# Patient Record
Sex: MALE | Race: WHITE | NOT HISPANIC OR LATINO | Employment: UNEMPLOYED | URBAN - METROPOLITAN AREA
[De-identification: names, ages, dates, MRNs, and addresses within clinical notes are randomized per-mention and may not be internally consistent; named-entity substitution may affect disease eponyms.]

---

## 2019-09-19 ENCOUNTER — TELEPHONE (OUTPATIENT)
Dept: FAMILY MEDICINE CLINIC | Facility: CLINIC | Age: 59
End: 2019-09-19

## 2019-09-19 DIAGNOSIS — F32.A DEPRESSION, UNSPECIFIED DEPRESSION TYPE: Primary | ICD-10-CM

## 2019-09-19 NOTE — TELEPHONE ENCOUNTER
Patient states he called a couple weeks ago, but there wasn't any thing in his chart.  Anyway, he needs a refill on escitalopram 20 mgs, but he is wanting to know if it can be increased to 40 mg or 60 mg.  He says when he saw Dr El tirado, she said if he wanted it increased to just call.  [facility], phone number is [phone].

## 2019-09-19 NOTE — TELEPHONE ENCOUNTER
Patient states he called a couple weeks ago, but there wasn't any thing in his chart.  Anyway, he needs a refill on escitalopram 20 mgs, but he is wanting to know if it can be increased to 40 mg or 60 mg.  He says when he saw Dr El tirado, she said if he wanted it increased to just call.  Ph[facility] is [facility], phone number is [phone].

## 2019-09-20 DIAGNOSIS — F32.A DEPRESSION, UNSPECIFIED DEPRESSION TYPE: ICD-10-CM

## 2019-09-20 RX ORDER — CITALOPRAM 40 MG/1
40 TABLET ORAL DAILY
Qty: 90 TABLET | Refills: 3 | Status: SHIPPED | OUTPATIENT
Start: 2019-09-20 | End: 2019-10-03 | Stop reason: SDUPTHER

## 2019-09-20 RX ORDER — CITALOPRAM 40 MG/1
40 TABLET ORAL DAILY
Qty: 90 TABLET | Refills: 3 | Status: SHIPPED | OUTPATIENT
Start: 2019-09-20 | End: 2019-09-20 | Stop reason: SDUPTHER

## 2019-10-03 ENCOUNTER — OFFICE VISIT (OUTPATIENT)
Dept: FAMILY MEDICINE CLINIC | Facility: CLINIC | Age: 59
End: 2019-10-03

## 2019-10-03 VITALS
WEIGHT: 180.31 LBS | TEMPERATURE: 98.6 F | OXYGEN SATURATION: 98 % | HEIGHT: 70 IN | HEART RATE: 62 BPM | SYSTOLIC BLOOD PRESSURE: 122 MMHG | BODY MASS INDEX: 25.81 KG/M2 | DIASTOLIC BLOOD PRESSURE: 78 MMHG

## 2019-10-03 DIAGNOSIS — Z00.00 HEALTH CARE MAINTENANCE: Primary | ICD-10-CM

## 2019-10-03 DIAGNOSIS — E78.5 HYPERLIPIDEMIA, UNSPECIFIED HYPERLIPIDEMIA TYPE: ICD-10-CM

## 2019-10-03 DIAGNOSIS — F32.A DEPRESSION, UNSPECIFIED DEPRESSION TYPE: ICD-10-CM

## 2019-10-03 PROCEDURE — 90750 HZV VACC RECOMBINANT IM: CPT | Performed by: FAMILY MEDICINE

## 2019-10-03 PROCEDURE — 90472 IMMUNIZATION ADMIN EACH ADD: CPT | Performed by: FAMILY MEDICINE

## 2019-10-03 PROCEDURE — 90471 IMMUNIZATION ADMIN: CPT | Performed by: FAMILY MEDICINE

## 2019-10-03 PROCEDURE — 90632 HEPA VACCINE ADULT IM: CPT | Performed by: FAMILY MEDICINE

## 2019-10-03 PROCEDURE — 90686 IIV4 VACC NO PRSV 0.5 ML IM: CPT | Performed by: FAMILY MEDICINE

## 2019-10-03 PROCEDURE — 99213 OFFICE O/P EST LOW 20 MIN: CPT | Performed by: FAMILY MEDICINE

## 2019-10-03 RX ORDER — ATORVASTATIN CALCIUM 40 MG/1
40 TABLET, FILM COATED ORAL DAILY
COMMUNITY
End: 2019-10-03

## 2019-10-03 RX ORDER — CITALOPRAM 40 MG/1
40 TABLET ORAL DAILY
Qty: 90 TABLET | Refills: 11 | Status: SHIPPED | OUTPATIENT
Start: 2019-10-03 | End: 2020-03-06 | Stop reason: SDUPTHER

## 2019-10-03 NOTE — PROGRESS NOTES
Subjective   Srinivasa Paz is a 59 y.o. male.     Chief Complaint   Patient presents with   • Hyperlipidemia   • Depression       Hyperlipidemia   This is a chronic problem. The current episode started more than 1 year ago. The problem is controlled. Recent lipid tests were reviewed and are normal. There are no known factors aggravating his hyperlipidemia. Pertinent negatives include no chest pain or shortness of breath. Current antihyperlipidemic treatment includes statins. The current treatment provides moderate improvement of lipids. Compliance problems include medication cost.  There are no known risk factors for coronary artery disease.   Depression   Visit Type: follow-up (stable on mes)  Patient presents with the following symptoms: weight loss.  Patient is not experiencing: palpitations and shortness of breath.           The following portions of the patient's history were reviewed and updated as appropriate: allergies, current medications, past family history, past medical history, past social history, past surgical history and problem list.    Past Medical History:   Diagnosis Date   • Abdominal pain, LLQ (left lower quadrant)    • Adverse reaction to drug    • Anxiety    • Bone pain    • Chest pain, midsternal    • Cough    • Current every day smoker    • Depression    • Encounter for smoking cessation counseling    • Hematuria    • High risk medication use    • Hyperglycemia    • Hyperlipidemia    • Irritability and anger    • Lymphoma (CMS/HCC)    • Proteinuria    • Sciatica    • Skin benign neoplasm    • Visual changes        Past Surgical History:   Procedure Laterality Date   • KNEE SURGERY Left    • KNEE SURGERY     • LYMPH NODE BIOPSY         Family History   Problem Relation Age of Onset   • Heart attack Father    • No Known Problems Other        Social History     Socioeconomic History   • Marital status:      Spouse name: Not on file   • Number of children: Not on file   • Years of  education: Not on file   • Highest education level: Not on file   Tobacco Use   • Smoking status: Current Every Day Smoker   • Smokeless tobacco: Never Used   Substance and Sexual Activity   • Alcohol use: Yes   • Drug use: No   • Sexual activity: Yes       Current Outpatient Medications on File Prior to Visit   Medication Sig Dispense Refill   • [DISCONTINUED] citalopram (CELEXA) 40 MG tablet Take 1 tablet by mouth Daily. 90 tablet 3   • [DISCONTINUED] atorvastatin (LIPITOR) 40 MG tablet Take 40 mg by mouth Daily.       No current facility-administered medications on file prior to visit.        Review of Systems   Constitutional: Positive for unexpected weight loss. Negative for fatigue.   Eyes: Negative for blurred vision.   Respiratory: Negative for cough, chest tightness and shortness of breath.    Cardiovascular: Negative for chest pain, palpitations and leg swelling.   Neurological: Negative for dizziness, light-headedness and headache.       Recent Results (from the past 4704 hour(s))   COMPREHENSIVE METABOLIC PANEL    Collection Time: 09/16/19  3:30 PM   Result Value Ref Range    Sodium 142 137 - 145 mmol/L    Potassium 4.5 3.5 - 5.1 mmol/L    Chloride 105 98 - 107 mmol/L    Total CO2 28 22 - 30 mmol/L    Glucose 89 74 - 99 mg/dL    BUN 14 7 - 20 mg/dL    Creatinine 1.2 0.7 - 1.5 mg/dL    BUN/Creatinine Ratio 11.7 RATIO    Total Protein 7.5 6.3 - 8.2 g/dL    Albumin 4.3 3.5 - 5.0 g/dL    Globulin 3.2 1.5 - 4.5 g/dL    A/G Ratio 1.3 1.1 - 2.5 RATIO    Calcium 9.4 8.4 - 10.2 mg/dL    Total Bilirubin 0.6 0.2 - 1.3 mg/dL    AST (SGOT) 27 15 - 46 U/L    ALT (SGPT) 27 13 - 69 U/L    Alkaline Phosphatase 109 38 - 126 U/L    Est GFR by Clearance >60 >60 /1.73 m2   LACTATE DEHYDROGENASE    Collection Time: 09/16/19  3:30 PM   Result Value Ref Range     313 - 618 U/L   CBC AND DIFFERENTIAL    Collection Time: 09/16/19  3:30 PM   Result Value Ref Range    WBC 5.95 4.5 - 11.0 10*3/uL    RBC 5.11 4.5 - 5.9 10*6/uL  "   Hemoglobin 15.5 13.5 - 17.5 g/dL    Hematocrit 45.8 41.0 - 53.0 %    MCV 89.6 80.0 - 100.0 fL    MCH 30.3 26.0 - 34.0 pg    MCHC 33.8 31.0 - 37.0 g/dL    RDW 13.6 12.0 - 16.8 %    Platelets 169 140 - 440 10*3/uL    MPV 9.4 6.7 - 10.8 fL    Neutrophil Rel % 66.4 45 - 80 %    Lymphocyte Rel % 23.0 15 - 50 %    Monocyte Rel % 6.6 0 - 15 %    Eosinophil % 3.0 0 - 7 %    Basophil Rel % 1.0 0 - 2 %    Neutrophils Absolute 3.95 2.0 - 8.8 10*3/uL    Lymphocytes Absolute 1.37 0.7 - 5.5 10*3/uL    Monocytes Absolute 0.39 0.0 - 1.7 10*3/uL    Eosinophils Absolute 0.18 0.0 - 0.8 10*3/uL    Basophils Absolute 0.06 0.0 - 0.2 10*3/uL     Objective   Vitals:    10/03/19 0738   BP: 122/78   Pulse: 62   Temp: 98.6 °F (37 °C)   SpO2: 98%   Weight: 81.8 kg (180 lb 5 oz)   Height: 177.8 cm (70\")     Body mass index is 25.87 kg/m².  Physical Exam   Constitutional: He appears well-developed and well-nourished. No distress.   Cardiovascular: Normal rate and regular rhythm. Exam reveals no friction rub.   No murmur heard.  Skin: He is not diaphoretic.   Psychiatric:   STABLE.   Vitals reviewed.        Assessment/Plan   Srinivasa was seen today for hyperlipidemia and depression.    Diagnoses and all orders for this visit:    Health care maintenance  -     Fluarix/Fluzone/Afluria Quad>6 Months  -     Hepatitis A Vaccine Adult IM  -     Shingrix Vaccine    Hyperlipidemia, unspecified hyperlipidemia type  -     Comprehensive Metabolic Panel  -     Lipid Panel  -     CBC & Differential  -     Pitavastatin Calcium (LIVALO) 4 MG tablet; Take 1 tablet by mouth Every Night.    Depression, unspecified depression type  -     citalopram (CELEXA) 40 MG tablet; Take 1 tablet by mouth Daily.               "

## 2019-10-04 LAB
ALBUMIN SERPL-MCNC: 4.4 G/DL (ref 3.5–5.2)
ALBUMIN/GLOB SERPL: 2 G/DL
ALP SERPL-CCNC: 118 U/L (ref 39–117)
ALT SERPL-CCNC: 17 U/L (ref 1–41)
AST SERPL-CCNC: 19 U/L (ref 1–40)
BASOPHILS # BLD AUTO: 0.09 10*3/MM3 (ref 0–0.2)
BASOPHILS NFR BLD AUTO: 1.5 % (ref 0–1.5)
BILIRUB SERPL-MCNC: 0.4 MG/DL (ref 0.2–1.2)
BUN SERPL-MCNC: 13 MG/DL (ref 8–23)
BUN/CREAT SERPL: 12.6 (ref 7–25)
CALCIUM SERPL-MCNC: 8.8 MG/DL (ref 8.6–10.5)
CHLORIDE SERPL-SCNC: 101 MMOL/L (ref 98–107)
CHOLEST SERPL-MCNC: 229 MG/DL (ref 0–200)
CO2 SERPL-SCNC: 25.8 MMOL/L (ref 22–29)
CREAT SERPL-MCNC: 1.03 MG/DL (ref 0.76–1.27)
EOSINOPHIL # BLD AUTO: 0.21 10*3/MM3 (ref 0–0.4)
EOSINOPHIL NFR BLD AUTO: 3.4 % (ref 0.3–6.2)
ERYTHROCYTE [DISTWIDTH] IN BLOOD BY AUTOMATED COUNT: 13.3 % (ref 12.3–15.4)
GLOBULIN SER CALC-MCNC: 2.2 GM/DL
GLUCOSE SERPL-MCNC: 95 MG/DL (ref 65–99)
HCT VFR BLD AUTO: 47.4 % (ref 37.5–51)
HDLC SERPL-MCNC: 42 MG/DL (ref 40–60)
HGB BLD-MCNC: 15.7 G/DL (ref 13–17.7)
IMM GRANULOCYTES # BLD AUTO: 0.01 10*3/MM3 (ref 0–0.05)
IMM GRANULOCYTES NFR BLD AUTO: 0.2 % (ref 0–0.5)
LDLC SERPL CALC-MCNC: 156 MG/DL (ref 0–100)
LYMPHOCYTES # BLD AUTO: 1.18 10*3/MM3 (ref 0.7–3.1)
LYMPHOCYTES NFR BLD AUTO: 19.3 % (ref 19.6–45.3)
MCH RBC QN AUTO: 30 PG (ref 26.6–33)
MCHC RBC AUTO-ENTMCNC: 33.1 G/DL (ref 31.5–35.7)
MCV RBC AUTO: 90.6 FL (ref 79–97)
MONOCYTES # BLD AUTO: 0.47 10*3/MM3 (ref 0.1–0.9)
MONOCYTES NFR BLD AUTO: 7.7 % (ref 5–12)
NEUTROPHILS # BLD AUTO: 4.16 10*3/MM3 (ref 1.7–7)
NEUTROPHILS NFR BLD AUTO: 67.9 % (ref 42.7–76)
NRBC BLD AUTO-RTO: 0 /100 WBC (ref 0–0.2)
PLATELET # BLD AUTO: 173 10*3/MM3 (ref 140–450)
POTASSIUM SERPL-SCNC: 4.2 MMOL/L (ref 3.5–5.2)
PROT SERPL-MCNC: 6.6 G/DL (ref 6–8.5)
RBC # BLD AUTO: 5.23 10*6/MM3 (ref 4.14–5.8)
SODIUM SERPL-SCNC: 140 MMOL/L (ref 136–145)
TRIGL SERPL-MCNC: 156 MG/DL (ref 0–150)
VLDLC SERPL CALC-MCNC: 31.2 MG/DL
WBC # BLD AUTO: 6.12 10*3/MM3 (ref 3.4–10.8)

## 2020-02-21 ENCOUNTER — TELEPHONE (OUTPATIENT)
Dept: FAMILY MEDICINE CLINIC | Facility: CLINIC | Age: 60
End: 2020-02-21

## 2020-03-06 ENCOUNTER — OFFICE VISIT (OUTPATIENT)
Dept: FAMILY MEDICINE CLINIC | Facility: CLINIC | Age: 60
End: 2020-03-06

## 2020-03-06 VITALS
BODY MASS INDEX: 26.7 KG/M2 | DIASTOLIC BLOOD PRESSURE: 62 MMHG | OXYGEN SATURATION: 98 % | WEIGHT: 186.5 LBS | SYSTOLIC BLOOD PRESSURE: 104 MMHG | HEART RATE: 60 BPM | HEIGHT: 70 IN

## 2020-03-06 DIAGNOSIS — Z00.00 MEDICARE ANNUAL WELLNESS VISIT, INITIAL: Primary | ICD-10-CM

## 2020-03-06 DIAGNOSIS — F32.A DEPRESSION, UNSPECIFIED DEPRESSION TYPE: ICD-10-CM

## 2020-03-06 DIAGNOSIS — E78.5 HYPERLIPIDEMIA, UNSPECIFIED HYPERLIPIDEMIA TYPE: ICD-10-CM

## 2020-03-06 PROCEDURE — 99213 OFFICE O/P EST LOW 20 MIN: CPT | Performed by: FAMILY MEDICINE

## 2020-03-06 PROCEDURE — G0438 PPPS, INITIAL VISIT: HCPCS | Performed by: FAMILY MEDICINE

## 2020-03-06 RX ORDER — CITALOPRAM 40 MG/1
40 TABLET ORAL DAILY
Qty: 90 TABLET | Refills: 11 | Status: SHIPPED | OUTPATIENT
Start: 2020-03-06 | End: 2020-06-12

## 2020-03-06 NOTE — PATIENT INSTRUCTIONS
Medicare Wellness  Personal Prevention Plan of Service     Date of Office Visit:  2020  Encounter Provider:  Nita Gallegos MD  Place of Service:  [facility]  Patient Name: Srinivasa Paz  :  1960    As part of the Medicare Wellness portion of your visit today, we are providing you with this personalized preventive plan of services (PPPS). This plan is based upon recommendations of the [place] Preventive Services Task Force (USPSTF) and the Advisory Committee on Immunization Practices (ACIP).    This lists the preventive care services that should be considered, and provides dates of when you are due. Items listed as completed are up-to-date and do not require any further intervention.    Health Maintenance   Topic Date Due   • ZOSTER VACCINE (2 of 2) 2019   • TDAP/TD VACCINES (1 - Tdap) 2021 (Originally 1970)   • LIPID PANEL  10/03/2020   • MEDICARE ANNUAL WELLNESS  2021   • COLONOSCOPY  2027   • PNEUMOCOCCAL VACCINE (19-64 MEDIUM RISK)  Completed   • INFLUENZA VACCINE  Completed   • HEPATITIS C SCREENING  Discontinued       No orders of the defined types were placed in this encounter.      Return in about 1 year (around 3/6/2021) for Medicare Wellness.

## 2020-03-06 NOTE — PROGRESS NOTES
Mary Jo Paz is a 59 y.o. male.     Chief Complaint   Patient presents with   • Hyperlipidemia       Hyperlipidemia   This is a chronic problem. The current episode started more than 1 year ago. The problem is uncontrolled. Recent lipid tests were reviewed and are high. Pertinent negatives include no chest pain or shortness of breath. Current antihyperlipidemic treatment includes statins.   Depression   Visit Type: follow-up (stable on meds)  Patient is not experiencing: palpitations and shortness of breath.           The following portions of the patient's history were reviewed and updated as appropriate: allergies, current medications, past family history, past medical history, past social history, past surgical history and problem list.    Past Medical History:   Diagnosis Date   • Abdominal pain, LLQ (left lower quadrant)    • Adverse reaction to drug    • Anxiety    • Bone pain    • Chest pain, midsternal    • Cough    • Current every day smoker    • Depression    • Encounter for smoking cessation counseling    • Hematuria    • High risk medication use    • Hyperglycemia    • Hyperlipidemia    • Irritability and anger    • Lymphoma (CMS/HCC)    • Proteinuria    • Sciatica    • Skin benign neoplasm    • Visual changes        Past Surgical History:   Procedure Laterality Date   • KNEE SURGERY Left    • KNEE SURGERY     • LYMPH NODE BIOPSY         Family History   Problem Relation Age of Onset   • Heart attack Father    • No Known Problems Other        Social History     Socioeconomic History   • Marital status:      Spouse name: Not on file   • Number of children: Not on file   • Years of education: Not on file   • Highest education level: Not on file   Tobacco Use   • Smoking status: Current Every Day Smoker   • Smokeless tobacco: Never Used   Substance and Sexual Activity   • Alcohol use: Yes   • Drug use: No   • Sexual activity: Yes       Current Outpatient Medications on File Prior to Visit    Medication Sig Dispense Refill   • [DISCONTINUED] citalopram (CELEXA) 40 MG tablet Take 1 tablet by mouth Daily. 90 tablet 11   • [DISCONTINUED] Pitavastatin Calcium (LIVALO) 4 MG tablet Take 1 tablet by mouth Every Night. 30 tablet 11     No current facility-administered medications on file prior to visit.        Review of Systems   Constitutional: Negative for fatigue.   Eyes: Negative for blurred vision.   Respiratory: Negative for cough, chest tightness and shortness of breath.    Cardiovascular: Negative for chest pain, palpitations and leg swelling.   Neurological: Negative for dizziness, light-headedness and headache.       Recent Results (from the past 4704 hour(s))   COMPREHENSIVE METABOLIC PANEL    Collection Time: 09/16/19  3:30 PM   Result Value Ref Range    Sodium 142 137 - 145 mmol/L    Potassium 4.5 3.5 - 5.1 mmol/L    Chloride 105 98 - 107 mmol/L    Total CO2 28 22 - 30 mmol/L    Glucose 89 74 - 99 mg/dL    BUN 14 7 - 20 mg/dL    Creatinine 1.2 0.7 - 1.5 mg/dL    BUN/Creatinine Ratio 11.7 RATIO    Total Protein 7.5 6.3 - 8.2 g/dL    Albumin 4.3 3.5 - 5.0 g/dL    Globulin 3.2 1.5 - 4.5 g/dL    A/G Ratio 1.3 1.1 - 2.5 RATIO    Calcium 9.4 8.4 - 10.2 mg/dL    Total Bilirubin 0.6 0.2 - 1.3 mg/dL    AST (SGOT) 27 15 - 46 U/L    ALT (SGPT) 27 13 - 69 U/L    Alkaline Phosphatase 109 38 - 126 U/L    Est GFR by Clearance >60 >60 /1.73 m2   CBC AND DIFFERENTIAL    Collection Time: 09/16/19  3:30 PM   Result Value Ref Range    WBC 5.95 4.5 - 11.0 10*3/uL    RBC 5.11 4.5 - 5.9 10*6/uL    Hemoglobin 15.5 13.5 - 17.5 g/dL    Hematocrit 45.8 41.0 - 53.0 %    MCV 89.6 80.0 - 100.0 fL    MCH 30.3 26.0 - 34.0 pg    MCHC 33.8 31.0 - 37.0 g/dL    RDW 13.6 12.0 - 16.8 %    Platelets 169 140 - 440 10*3/uL    MPV 9.4 6.7 - 10.8 fL    Neutrophil Rel % 66.4 45 - 80 %    Lymphocyte Rel % 23.0 15 - 50 %    Monocyte Rel % 6.6 0 - 15 %    Eosinophil % 3.0 0 - 7 %    Basophil Rel % 1.0 0 - 2 %    Neutrophils Absolute 3.95 2.0 -  8.8 10*3/uL    Lymphocytes Absolute 1.37 0.7 - 5.5 10*3/uL    Monocytes Absolute 0.39 0.0 - 1.7 10*3/uL    Eosinophils Absolute 0.18 0.0 - 0.8 10*3/uL    Basophils Absolute 0.06 0.0 - 0.2 10*3/uL   LACTATE DEHYDROGENASE    Collection Time: 09/16/19  3:30 PM   Result Value Ref Range     313 - 618 U/L   Comprehensive Metabolic Panel    Collection Time: 10/03/19  8:49 AM   Result Value Ref Range    Glucose 95 65 - 99 mg/dL    BUN 13 8 - 23 mg/dL    Creatinine 1.03 0.76 - 1.27 mg/dL    eGFR Non African Am 74 >60 mL/min/1.73    eGFR African Am 89 >60 mL/min/1.73    BUN/Creatinine Ratio 12.6 7.0 - 25.0    Sodium 140 136 - 145 mmol/L    Potassium 4.2 3.5 - 5.2 mmol/L    Chloride 101 98 - 107 mmol/L    Total CO2 25.8 22.0 - 29.0 mmol/L    Calcium 8.8 8.6 - 10.5 mg/dL    Total Protein 6.6 6.0 - 8.5 g/dL    Albumin 4.40 3.50 - 5.20 g/dL    Globulin 2.2 gm/dL    A/G Ratio 2.0 g/dL    Total Bilirubin 0.4 0.2 - 1.2 mg/dL    Alkaline Phosphatase 118 (H) 39 - 117 U/L    AST (SGOT) 19 1 - 40 U/L    ALT (SGPT) 17 1 - 41 U/L   Lipid Panel    Collection Time: 10/03/19  8:49 AM   Result Value Ref Range    Total Cholesterol 229 (H) 0 - 200 mg/dL    Triglycerides 156 (H) 0 - 150 mg/dL    HDL Cholesterol 42 40 - 60 mg/dL    VLDL Cholesterol 31.2 mg/dL    LDL Cholesterol  156 (H) 0 - 100 mg/dL   CBC & Differential    Collection Time: 10/03/19  8:49 AM   Result Value Ref Range    WBC 6.12 3.40 - 10.80 10*3/mm3    RBC 5.23 4.14 - 5.80 10*6/mm3    Hemoglobin 15.7 13.0 - 17.7 g/dL    Hematocrit 47.4 37.5 - 51.0 %    MCV 90.6 79.0 - 97.0 fL    MCH 30.0 26.6 - 33.0 pg    MCHC 33.1 31.5 - 35.7 g/dL    RDW 13.3 12.3 - 15.4 %    Platelets 173 140 - 450 10*3/mm3    Neutrophil Rel % 67.9 42.7 - 76.0 %    Lymphocyte Rel % 19.3 (L) 19.6 - 45.3 %    Monocyte Rel % 7.7 5.0 - 12.0 %    Eosinophil Rel % 3.4 0.3 - 6.2 %    Basophil Rel % 1.5 0.0 - 1.5 %    Neutrophils Absolute 4.16 1.70 - 7.00 10*3/mm3    Lymphocytes Absolute 1.18 0.70 - 3.10  "10*3/mm3    Monocytes Absolute 0.47 0.10 - 0.90 10*3/mm3    Eosinophils Absolute 0.21 0.00 - 0.40 10*3/mm3    Basophils Absolute 0.09 0.00 - 0.20 10*3/mm3    Immature Granulocyte Rel % 0.2 0.0 - 0.5 %    Immature Grans Absolute 0.01 0.00 - 0.05 10*3/mm3    nRBC 0.0 0.0 - 0.2 /100 WBC     Objective   Vitals:    03/06/20 0824   BP: 104/62   Pulse: 60   SpO2: 98%   Weight: 84.6 kg (186 lb 8 oz)   Height: 177.8 cm (70\")     Body mass index is 26.76 kg/m².  Physical Exam   Constitutional: He appears well-developed and well-nourished. No distress.   Cardiovascular: Normal rate and regular rhythm.   Pulmonary/Chest: Effort normal and breath sounds normal. No respiratory distress. He has no wheezes.   Skin: He is not diaphoretic.   Nursing note and vitals reviewed.        Assessment/Plan   Srinivasa was seen today for hyperlipidemia.    Diagnoses and all orders for this visit:    Medicare annual wellness visit, initial    Hyperlipidemia, unspecified hyperlipidemia type  -     Comprehensive Metabolic Panel  -     Lipid Panel  -     Pitavastatin Calcium (LIVALO) 4 MG tablet; Take 1 tablet by mouth Every Night.    Depression, unspecified depression type  -     citalopram (CELEXA) 40 MG tablet; Take 1 tablet by mouth Daily.      Return in about 1 year (around 3/6/2021) for Medicare Wellness.  Cont current meds for above         "

## 2020-03-06 NOTE — PROGRESS NOTES
The ABCs of the Annual Wellness Visit  Initial Medicare Wellness Visit    Chief Complaint   Patient presents with   • Hyperlipidemia       Subjective   History of Present Illness:  Srinivasa Paz is a 59 y.o. male who presents for an Initial Medicare Wellness Visit.    HEALTH RISK ASSESSMENT    Recent Hospitalizations:  No hospitalization(s) within the last year.    Current Medical Providers:  Patient Care Team:  Nita Gallegos MD as PCP - General (Family Medicine)    Smoking Status:  Social History     Tobacco Use   Smoking Status Current Every Day Smoker   Smokeless Tobacco Never Used       Alcohol Consumption:  Social History     Substance and Sexual Activity   Alcohol Use Yes       Depression Screen:   PHQ-2/PHQ-9 Depression Screening 3/6/2020   Little interest or pleasure in doing things 1   Feeling down, depressed, or hopeless 1   Total Score 2       Fall Risk Screen:  STEADI Fall Risk Assessment was completed, and patient is at LOW risk for falls.Assessment completed on:3/6/2020    Health Habits and Functional and Cognitive Screening:  Functional & Cognitive Status 3/6/2020   Do you have difficulty preparing food and eating? No   Do you have difficulty bathing yourself, getting dressed or grooming yourself? No   Do you have difficulty using the toilet? No   Do you have difficulty moving around from place to place? No   Do you have trouble with steps or getting out of a bed or a chair? No   Current Diet Well Balanced Diet   Dental Exam Up to date   Eye Exam Up to date   Exercise (times per week) 4 times per week   Current Exercise Activities Include Cardiovasular Workout on Exercise Equipment   Do you need help using the phone?  No   Are you deaf or do you have serious difficulty hearing?  No   Do you need help with transportation? No   Do you need help shopping? No   Do you need help preparing meals?  No   Do you need help with housework?  No   Do you need help with laundry? No   Do you need help taking your  medications? No   Do you need help managing money? No   Do you ever drive or ride in a car without wearing a seat belt? No   Have you felt unusual stress, anger or loneliness in the last month? No   Who do you live with? Spouse   If you need help, do you have trouble finding someone available to you? No   Have you been bothered in the last four weeks by sexual problems? No   Do you have difficulty concentrating, remembering or making decisions? No         Does the patient have evidence of cognitive impairment? No    Asprin use counseling:Does not need ASA (and currently is not on it)    Age-appropriate Screening Schedule:  Refer to the list below for future screening recommendations based on patient's age, sex and/or medical conditions. Orders for these recommended tests are listed in the plan section. The patient has been provided with a written plan.    Health Maintenance   Topic Date Due   • ZOSTER VACCINE (2 of 2) 11/28/2019   • TDAP/TD VACCINES (1 - Tdap) 03/06/2021 (Originally 5/19/1971)   • LIPID PANEL  10/03/2020   • COLONOSCOPY  12/08/2027   • PNEUMOCOCCAL VACCINE (19-64 MEDIUM RISK)  Completed   • INFLUENZA VACCINE  Completed          The following portions of the patient's history were reviewed and updated as appropriate: allergies, current medications, past family history, past medical history, past social history, past surgical history and problem list.    Outpatient Medications Prior to Visit   Medication Sig Dispense Refill   • citalopram (CELEXA) 40 MG tablet Take 1 tablet by mouth Daily. 90 tablet 11   • Pitavastatin Calcium (LIVALO) 4 MG tablet Take 1 tablet by mouth Every Night. 30 tablet 11     No facility-administered medications prior to visit.        Patient Active Problem List   Diagnosis   • Medicare annual wellness visit, initial       Advanced Care Planning:  ACP discussion was held with the patient during this visit.    Review of Systems   Constitutional: Negative.        Compared to one  "year ago, the patient feels his physical health is the same.  Compared to one year ago, the patient feels his mental health is the same.    Reviewed chart for potential of high risk medication in the elderly: yes  Reviewed chart for potential of harmful drug interactions in the elderly:yes    Objective         Vitals:    03/06/20 0824   BP: 104/62   Pulse: 60   SpO2: 98%   Weight: 84.6 kg (186 lb 8 oz)   Height: 177.8 cm (70\")       Body mass index is 26.76 kg/m².  Discussed the patient's BMI with him. The BMI is above average; BMI management plan is completed.    Physical Exam   Constitutional: He appears well-developed and well-nourished. No distress.   Skin: He is not diaphoretic.   Nursing note and vitals reviewed.            Assessment/Plan   Medicare Risks and Personalized Health Plan  CMS Preventative Services Quick Reference  Fall Risk    The above risks/problems have been discussed with the patient.  Pertinent information has been shared with the patient in the After Visit Summary.  Follow up plans and orders are seen below in the Assessment/Plan Section.    Diagnoses and all orders for this visit:    1. Medicare annual wellness visit, initial (Primary)    2. Hyperlipidemia, unspecified hyperlipidemia type      Follow Up:  Return in about 1 year (around 3/6/2021) for Medicare Wellness.     An After Visit Summary and PPPS were given to the patient.           "

## 2020-03-07 LAB
ALBUMIN SERPL-MCNC: 4.5 G/DL (ref 3.5–5.2)
ALBUMIN/GLOB SERPL: 1.8 G/DL
ALP SERPL-CCNC: 138 U/L (ref 39–117)
ALT SERPL-CCNC: 21 U/L (ref 1–41)
AST SERPL-CCNC: 19 U/L (ref 1–40)
BILIRUB SERPL-MCNC: 0.4 MG/DL (ref 0.2–1.2)
BUN SERPL-MCNC: 14 MG/DL (ref 8–23)
BUN/CREAT SERPL: 12.2 (ref 7–25)
CALCIUM SERPL-MCNC: 9.5 MG/DL (ref 8.6–10.5)
CHLORIDE SERPL-SCNC: 104 MMOL/L (ref 98–107)
CHOLEST SERPL-MCNC: 165 MG/DL (ref 0–200)
CO2 SERPL-SCNC: 26.6 MMOL/L (ref 22–29)
CREAT SERPL-MCNC: 1.15 MG/DL (ref 0.76–1.27)
GLOBULIN SER CALC-MCNC: 2.5 GM/DL
GLUCOSE SERPL-MCNC: 119 MG/DL (ref 65–99)
HDLC SERPL-MCNC: 43 MG/DL (ref 40–60)
LDLC SERPL CALC-MCNC: 101 MG/DL (ref 0–100)
POTASSIUM SERPL-SCNC: 5.1 MMOL/L (ref 3.5–5.2)
PROT SERPL-MCNC: 7 G/DL (ref 6–8.5)
SODIUM SERPL-SCNC: 144 MMOL/L (ref 136–145)
TRIGL SERPL-MCNC: 105 MG/DL (ref 0–150)
VLDLC SERPL CALC-MCNC: 21 MG/DL

## 2020-06-11 ENCOUNTER — TELEPHONE (OUTPATIENT)
Dept: FAMILY MEDICINE CLINIC | Facility: CLINIC | Age: 60
End: 2020-06-11

## 2020-06-11 NOTE — TELEPHONE ENCOUNTER
PATIENT STATES: that Pitavastatin Calcium (LIVALO) 4 MG tablet cost to much and would like to know if there is something else he can take. He aslo says that citalopram (CELEXA) 40 MG tablet is not working for himat all and he think its making him a mean person from time to time. Please advise     PATIENT CAN BE REACHED ON:[phone]    PHARMACY PREFERRED:[facility] [address] [place] [address] BYPASS AT [facility] & (GERALDINE APONTE) - [phone]  - [phone] FX

## 2020-06-12 ENCOUNTER — OFFICE VISIT (OUTPATIENT)
Dept: FAMILY MEDICINE CLINIC | Facility: CLINIC | Age: 60
End: 2020-06-12

## 2020-06-12 DIAGNOSIS — E78.5 HYPERLIPIDEMIA, UNSPECIFIED HYPERLIPIDEMIA TYPE: Primary | ICD-10-CM

## 2020-06-12 DIAGNOSIS — F32.A DEPRESSION, UNSPECIFIED DEPRESSION TYPE: ICD-10-CM

## 2020-06-12 DIAGNOSIS — F41.9 ANXIETY: ICD-10-CM

## 2020-06-12 PROCEDURE — 99442 PR PHYS/QHP TELEPHONE EVALUATION 11-20 MIN: CPT | Performed by: FAMILY MEDICINE

## 2020-06-12 RX ORDER — DIAZEPAM 5 MG/1
5 TABLET ORAL NIGHTLY PRN
Qty: 30 TABLET | Refills: 3 | Status: SHIPPED | OUTPATIENT
Start: 2020-06-12 | End: 2020-10-13

## 2020-06-12 RX ORDER — DESVENLAFAXINE SUCCINATE 50 MG/1
50 TABLET, EXTENDED RELEASE ORAL DAILY
Qty: 30 TABLET | Refills: 11 | Status: SHIPPED | OUTPATIENT
Start: 2020-06-12 | End: 2020-07-29

## 2020-06-12 NOTE — PROGRESS NOTES
Mary Jo Paz is a 60 y.o. male.     Consent given    Telephone visit    Time 15 min    CC: follow up on cholesterol and depression/anxiety: not controlled  Also c/o on Insomnia    Depression   Visit Type: follow-up (not controlled on current therapy)  Patient presents with the following symptoms: depressed mood, excessive worry and nervousness/anxiety.  Severity: moderate   Sleep quality: poor      Anxiety   Presents for initial (can not sleep) visit. Symptoms include depressed mood, excessive worry and nervous/anxious behavior.     His past medical history is significant for depression.   Hyperlipidemia   This is a chronic problem. The current episode started more than 1 year ago. The problem is controlled. Recent lipid tests were reviewed and are normal. Treatments tried: stopped Livalo, could not affort it. Compliance problems include medication cost.           The following portions of the patient's history were reviewed and updated as appropriate: allergies, current medications, past family history, past medical history, past social history, past surgical history and problem list.    Past Medical History:   Diagnosis Date   • Abdominal pain, LLQ (left lower quadrant)    • Adverse reaction to drug    • Anxiety    • Bone pain    • Chest pain, midsternal    • Cough    • Current every day smoker    • Depression    • Encounter for smoking cessation counseling    • Hematuria    • High risk medication use    • Hyperglycemia    • Hyperlipidemia    • Irritability and anger    • Lymphoma (CMS/HCC)    • Proteinuria    • Sciatica    • Skin benign neoplasm    • Visual changes        Past Surgical History:   Procedure Laterality Date   • KNEE SURGERY Left    • KNEE SURGERY     • LYMPH NODE BIOPSY         Family History   Problem Relation Age of Onset   • Heart attack Father    • No Known Problems Other        Social History     Socioeconomic History   • Marital status:      Spouse name: Not on file   •  Number of children: Not on file   • Years of education: Not on file   • Highest education level: Not on file   Tobacco Use   • Smoking status: Current Every Day Smoker   • Smokeless tobacco: Never Used   Substance and Sexual Activity   • Alcohol use: Yes   • Drug use: No   • Sexual activity: Yes       Current Outpatient Medications on File Prior to Visit   Medication Sig Dispense Refill   • [DISCONTINUED] citalopram (CELEXA) 40 MG tablet Take 1 tablet by mouth Daily. 90 tablet 11   • [DISCONTINUED] Pitavastatin Calcium (LIVALO) 4 MG tablet Take 1 tablet by mouth Every Night. 90 tablet 11     No current facility-administered medications on file prior to visit.        Review of Systems   Psychiatric/Behavioral: Positive for depressed mood. The patient is nervous/anxious.        Recent Results (from the past 4704 hour(s))   Comprehensive Metabolic Panel    Collection Time: 03/06/20  8:50 AM   Result Value Ref Range    Glucose 119 (H) 65 - 99 mg/dL    BUN 14 8 - 23 mg/dL    Creatinine 1.15 0.76 - 1.27 mg/dL    eGFR Non African Am 65 >60 mL/min/1.73    eGFR African Am 79 >60 mL/min/1.73    BUN/Creatinine Ratio 12.2 7.0 - 25.0    Sodium 144 136 - 145 mmol/L    Potassium 5.1 3.5 - 5.2 mmol/L    Chloride 104 98 - 107 mmol/L    Total CO2 26.6 22.0 - 29.0 mmol/L    Calcium 9.5 8.6 - 10.5 mg/dL    Total Protein 7.0 6.0 - 8.5 g/dL    Albumin 4.50 3.50 - 5.20 g/dL    Globulin 2.5 gm/dL    A/G Ratio 1.8 g/dL    Total Bilirubin 0.4 0.2 - 1.2 mg/dL    Alkaline Phosphatase 138 (H) 39 - 117 U/L    AST (SGOT) 19 1 - 40 U/L    ALT (SGPT) 21 1 - 41 U/L   Lipid Panel    Collection Time: 03/06/20  8:50 AM   Result Value Ref Range    Total Cholesterol 165 0 - 200 mg/dL    Triglycerides 105 0 - 150 mg/dL    HDL Cholesterol 43 40 - 60 mg/dL    VLDL Cholesterol 21 mg/dL    LDL Cholesterol  101 (H) 0 - 100 mg/dL     Objective   There were no vitals filed for this visit.  There is no height or weight on file to calculate BMI.  Physical  Exam      Assessment/Plan   Diagnoses and all orders for this visit:    Hyperlipidemia, unspecified hyperlipidemia type  -     Pitavastatin Calcium (Livalo) 4 MG tablet; Take 1 tablet by mouth Every Night.    Depression, unspecified depression type  -     desvenlafaxine (Pristiq) 50 MG 24 hr tablet; Take 1 tablet by mouth Daily.    Anxiety  -     diazePAM (Valium) 5 MG tablet; Take 1 tablet by mouth At Night As Needed for Anxiety.    Return in about 4 weeks (around 7/10/2020) for HYPERLIPIDEMIA, depression.

## 2020-06-12 NOTE — TELEPHONE ENCOUNTER
Called and left message for patient to return call and schedule telephone visit with dr. huntley today.

## 2020-07-14 ENCOUNTER — OFFICE VISIT (OUTPATIENT)
Dept: FAMILY MEDICINE CLINIC | Facility: CLINIC | Age: 60
End: 2020-07-14

## 2020-07-14 VITALS
OXYGEN SATURATION: 98 % | HEIGHT: 70 IN | SYSTOLIC BLOOD PRESSURE: 120 MMHG | BODY MASS INDEX: 26.41 KG/M2 | DIASTOLIC BLOOD PRESSURE: 78 MMHG | TEMPERATURE: 97.8 F | HEART RATE: 70 BPM | WEIGHT: 184.5 LBS

## 2020-07-14 DIAGNOSIS — E78.5 HYPERLIPIDEMIA, UNSPECIFIED HYPERLIPIDEMIA TYPE: Primary | ICD-10-CM

## 2020-07-14 DIAGNOSIS — C82.85: ICD-10-CM

## 2020-07-14 DIAGNOSIS — F32.A DEPRESSION, UNSPECIFIED DEPRESSION TYPE: ICD-10-CM

## 2020-07-14 DIAGNOSIS — F41.9 ANXIETY: ICD-10-CM

## 2020-07-14 PROCEDURE — 99214 OFFICE O/P EST MOD 30 MIN: CPT | Performed by: FAMILY MEDICINE

## 2020-07-14 NOTE — PROGRESS NOTES
Mary Jo Paz is a 60 y.o. male.     Chief Complaint   Patient presents with   • Hyperlipidemia   • Med Refill   • Anxiety   • Depression       Hyperlipidemia   This is a chronic problem. The problem is controlled. Recent lipid tests were reviewed and are normal. Pertinent negatives include no chest pain or shortness of breath.   Anxiety   Presents for follow-up (stable on meds) visit. Patient reports no chest pain, dizziness, palpitations or shortness of breath.     His past medical history is significant for depression.   Depression   Visit Type: follow-up (better on med)  Patient is not experiencing: palpitations and shortness of breath.           The following portions of the patient's history were reviewed and updated as appropriate: allergies, current medications, past family history, past medical history, past social history, past surgical history and problem list.    Past Medical History:   Diagnosis Date   • Abdominal pain, LLQ (left lower quadrant)    • Adverse reaction to drug    • Anxiety    • Bone pain    • Chest pain, midsternal    • Cough    • Current every day smoker    • Depression    • Encounter for smoking cessation counseling    • Hematuria    • High risk medication use    • Hyperglycemia    • Hyperlipidemia    • Irritability and anger    • Lymphoma (CMS/HCC)    • Proteinuria    • Sciatica    • Skin benign neoplasm    • Visual changes        Past Surgical History:   Procedure Laterality Date   • KNEE SURGERY Left    • KNEE SURGERY     • LYMPH NODE BIOPSY         Family History   Problem Relation Age of Onset   • Heart attack Father    • No Known Problems Other        Social History     Socioeconomic History   • Marital status:      Spouse name: Not on file   • Number of children: Not on file   • Years of education: Not on file   • Highest education level: Not on file   Tobacco Use   • Smoking status: Current Every Day Smoker   • Smokeless tobacco: Never Used   Substance and  Sexual Activity   • Alcohol use: Yes   • Drug use: No   • Sexual activity: Yes       Current Outpatient Medications on File Prior to Visit   Medication Sig Dispense Refill   • desvenlafaxine (Pristiq) 50 MG 24 hr tablet Take 1 tablet by mouth Daily. 30 tablet 11   • diazePAM (Valium) 5 MG tablet Take 1 tablet by mouth At Night As Needed for Anxiety. 30 tablet 3   • Pitavastatin Calcium (Livalo) 4 MG tablet Take 1 tablet by mouth Every Night. 90 tablet 11     No current facility-administered medications on file prior to visit.        Review of Systems   Constitutional: Negative for fatigue.   Eyes: Negative for blurred vision.   Respiratory: Negative for cough, chest tightness and shortness of breath.    Cardiovascular: Negative for chest pain, palpitations and leg swelling.   Neurological: Negative for dizziness, light-headedness and headache.       Recent Results (from the past 4704 hour(s))   Comprehensive Metabolic Panel    Collection Time: 03/06/20  8:50 AM   Result Value Ref Range    Glucose 119 (H) 65 - 99 mg/dL    BUN 14 8 - 23 mg/dL    Creatinine 1.15 0.76 - 1.27 mg/dL    eGFR Non African Am 65 >60 mL/min/1.73    eGFR African Am 79 >60 mL/min/1.73    BUN/Creatinine Ratio 12.2 7.0 - 25.0    Sodium 144 136 - 145 mmol/L    Potassium 5.1 3.5 - 5.2 mmol/L    Chloride 104 98 - 107 mmol/L    Total CO2 26.6 22.0 - 29.0 mmol/L    Calcium 9.5 8.6 - 10.5 mg/dL    Total Protein 7.0 6.0 - 8.5 g/dL    Albumin 4.50 3.50 - 5.20 g/dL    Globulin 2.5 gm/dL    A/G Ratio 1.8 g/dL    Total Bilirubin 0.4 0.2 - 1.2 mg/dL    Alkaline Phosphatase 138 (H) 39 - 117 U/L    AST (SGOT) 19 1 - 40 U/L    ALT (SGPT) 21 1 - 41 U/L   Lipid Panel    Collection Time: 03/06/20  8:50 AM   Result Value Ref Range    Total Cholesterol 165 0 - 200 mg/dL    Triglycerides 105 0 - 150 mg/dL    HDL Cholesterol 43 40 - 60 mg/dL    VLDL Cholesterol 21 mg/dL    LDL Cholesterol  101 (H) 0 - 100 mg/dL     Objective   Vitals:    07/14/20 0831   BP: 120/78  "  Pulse: 70   Temp: 97.8 °F (36.6 °C)   SpO2: 98%   Weight: 83.7 kg (184 lb 8 oz)   Height: 177.8 cm (70\")     Body mass index is 26.47 kg/m².  Physical Exam   Constitutional: He appears well-developed and well-nourished. No distress.   Cardiovascular: Normal rate and regular rhythm.   Pulmonary/Chest: Effort normal and breath sounds normal. No respiratory distress. He has no wheezes.   Skin: He is not diaphoretic.   Nursing note and vitals reviewed.        Assessment/Plan   Srinivasa was seen today for hyperlipidemia, med refill, anxiety and depression.    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           "

## 2020-07-15 LAB
ALBUMIN SERPL-MCNC: 4.6 G/DL (ref 3.5–5.2)
ALBUMIN/GLOB SERPL: 2.4 G/DL
ALP SERPL-CCNC: 132 U/L (ref 39–117)
ALT SERPL-CCNC: 16 U/L (ref 1–41)
AST SERPL-CCNC: 16 U/L (ref 1–40)
BASOPHILS # BLD AUTO: 0.06 10*3/MM3 (ref 0–0.2)
BASOPHILS NFR BLD AUTO: 1.2 % (ref 0–1.5)
BILIRUB SERPL-MCNC: 0.5 MG/DL (ref 0–1.2)
BUN SERPL-MCNC: 13 MG/DL (ref 8–23)
BUN/CREAT SERPL: 10.8 (ref 7–25)
CALCIUM SERPL-MCNC: 9.1 MG/DL (ref 8.6–10.5)
CHLORIDE SERPL-SCNC: 104 MMOL/L (ref 98–107)
CHOLEST SERPL-MCNC: 175 MG/DL (ref 0–200)
CO2 SERPL-SCNC: 27.7 MMOL/L (ref 22–29)
CREAT SERPL-MCNC: 1.2 MG/DL (ref 0.76–1.27)
EOSINOPHIL # BLD AUTO: 0.19 10*3/MM3 (ref 0–0.4)
EOSINOPHIL NFR BLD AUTO: 3.9 % (ref 0.3–6.2)
ERYTHROCYTE [DISTWIDTH] IN BLOOD BY AUTOMATED COUNT: 13.1 % (ref 12.3–15.4)
GLOBULIN SER CALC-MCNC: 1.9 GM/DL
GLUCOSE SERPL-MCNC: 112 MG/DL (ref 65–99)
HCT VFR BLD AUTO: 46.1 % (ref 37.5–51)
HDLC SERPL-MCNC: 40 MG/DL (ref 40–60)
HGB BLD-MCNC: 16 G/DL (ref 13–17.7)
IMM GRANULOCYTES # BLD AUTO: 0.01 10*3/MM3 (ref 0–0.05)
IMM GRANULOCYTES NFR BLD AUTO: 0.2 % (ref 0–0.5)
LDLC SERPL CALC-MCNC: 113 MG/DL (ref 0–100)
LYMPHOCYTES # BLD AUTO: 0.98 10*3/MM3 (ref 0.7–3.1)
LYMPHOCYTES NFR BLD AUTO: 19.9 % (ref 19.6–45.3)
MCH RBC QN AUTO: 31.5 PG (ref 26.6–33)
MCHC RBC AUTO-ENTMCNC: 34.7 G/DL (ref 31.5–35.7)
MCV RBC AUTO: 90.7 FL (ref 79–97)
MONOCYTES # BLD AUTO: 0.37 10*3/MM3 (ref 0.1–0.9)
MONOCYTES NFR BLD AUTO: 7.5 % (ref 5–12)
NEUTROPHILS # BLD AUTO: 3.32 10*3/MM3 (ref 1.7–7)
NEUTROPHILS NFR BLD AUTO: 67.3 % (ref 42.7–76)
NRBC BLD AUTO-RTO: 0 /100 WBC (ref 0–0.2)
PLATELET # BLD AUTO: 163 10*3/MM3 (ref 140–450)
POTASSIUM SERPL-SCNC: 4.4 MMOL/L (ref 3.5–5.2)
PROT SERPL-MCNC: 6.5 G/DL (ref 6–8.5)
RBC # BLD AUTO: 5.08 10*6/MM3 (ref 4.14–5.8)
SODIUM SERPL-SCNC: 141 MMOL/L (ref 136–145)
TRIGL SERPL-MCNC: 109 MG/DL (ref 0–150)
VLDLC SERPL CALC-MCNC: 21.8 MG/DL
WBC # BLD AUTO: 4.93 10*3/MM3 (ref 3.4–10.8)

## 2020-07-19 ENCOUNTER — RESULTS ENCOUNTER (OUTPATIENT)
Dept: FAMILY MEDICINE CLINIC | Facility: CLINIC | Age: 60
End: 2020-07-19

## 2020-07-19 DIAGNOSIS — C82.85: ICD-10-CM

## 2020-07-29 ENCOUNTER — TELEPHONE (OUTPATIENT)
Dept: FAMILY MEDICINE CLINIC | Facility: CLINIC | Age: 60
End: 2020-07-29

## 2020-07-29 DIAGNOSIS — F32.A DEPRESSION, UNSPECIFIED DEPRESSION TYPE: Primary | ICD-10-CM

## 2020-07-29 RX ORDER — BUPROPION HYDROCHLORIDE 150 MG/1
150 TABLET ORAL DAILY
Qty: 90 TABLET | Refills: 3 | Status: SHIPPED | OUTPATIENT
Start: 2020-07-29 | End: 2020-09-14

## 2020-07-29 NOTE — TELEPHONE ENCOUNTER
Patient is having some side effects from the desvenlafaxine 50 mg and wants to know if he could be put on Wellbutrin?  His work cell number is [phone].  Pharmacy is [facility] on file.

## 2020-09-02 ENCOUNTER — TELEPHONE (OUTPATIENT)
Dept: FAMILY MEDICINE CLINIC | Facility: CLINIC | Age: 60
End: 2020-09-02

## 2020-09-02 NOTE — TELEPHONE ENCOUNTER
Pt called and is asking for amaples of   Pitavastatin Calcium (Livalo) 4 MG tablet    He will be ou ton 9-

## 2020-09-14 ENCOUNTER — OFFICE VISIT (OUTPATIENT)
Dept: FAMILY MEDICINE CLINIC | Facility: CLINIC | Age: 60
End: 2020-09-14

## 2020-09-14 VITALS
DIASTOLIC BLOOD PRESSURE: 78 MMHG | BODY MASS INDEX: 26.92 KG/M2 | WEIGHT: 188 LBS | HEART RATE: 85 BPM | SYSTOLIC BLOOD PRESSURE: 118 MMHG | HEIGHT: 70 IN | TEMPERATURE: 98 F | OXYGEN SATURATION: 98 %

## 2020-09-14 DIAGNOSIS — Z23 IMMUNIZATION DUE: ICD-10-CM

## 2020-09-14 DIAGNOSIS — F17.200 SMOKER: ICD-10-CM

## 2020-09-14 DIAGNOSIS — R73.9 HYPERGLYCEMIA: Primary | ICD-10-CM

## 2020-09-14 DIAGNOSIS — Z00.00 HEALTH MAINTENANCE EXAMINATION: ICD-10-CM

## 2020-09-14 DIAGNOSIS — F17.210 HEAVY CIGARETTE SMOKER: ICD-10-CM

## 2020-09-14 DIAGNOSIS — F32.A DEPRESSION, UNSPECIFIED DEPRESSION TYPE: ICD-10-CM

## 2020-09-14 DIAGNOSIS — E78.2 MIXED HYPERLIPIDEMIA: ICD-10-CM

## 2020-09-14 PROCEDURE — 99214 OFFICE O/P EST MOD 30 MIN: CPT | Performed by: FAMILY MEDICINE

## 2020-09-14 PROCEDURE — G0008 ADMIN INFLUENZA VIRUS VAC: HCPCS | Performed by: FAMILY MEDICINE

## 2020-09-14 PROCEDURE — 90686 IIV4 VACC NO PRSV 0.5 ML IM: CPT | Performed by: FAMILY MEDICINE

## 2020-09-14 RX ORDER — ROSUVASTATIN CALCIUM 20 MG/1
20 TABLET, COATED ORAL DAILY
Qty: 90 TABLET | Refills: 3 | Status: SHIPPED | OUTPATIENT
Start: 2020-09-14 | End: 2020-11-12

## 2020-09-14 RX ORDER — BUPROPION HYDROCHLORIDE 150 MG/1
150 TABLET ORAL 2 TIMES DAILY
Qty: 180 TABLET | Refills: 3 | Status: SHIPPED | OUTPATIENT
Start: 2020-09-14 | End: 2020-11-19 | Stop reason: SDUPTHER

## 2020-09-14 NOTE — PROGRESS NOTES
Subjective   Srinivasa Paz is a 60 y.o. male.     Chief Complaint   Patient presents with   • follow up   • Med Refill       History of Present Illness   Patient is here to follow-up on his abnormal lab results.  Hyperglycemia follow-up will need to run A1c today.  He is also saying that he would like to get his bupropion up graded to due to depression as well as smoking cessation.  Will bring bupropion  mg to twice a day.  Hyperlipidemia was controlled on Livalo however he requests a change because Livalo is no way covered on his insurance list check and change to Crestor    The following portions of the patient's history were reviewed and updated as appropriate: allergies, current medications, past family history, past medical history, past social history, past surgical history and problem list.    Past Medical History:   Diagnosis Date   • Abdominal pain, LLQ (left lower quadrant)    • Adverse reaction to drug    • Anxiety    • Bone pain    • Chest pain, midsternal    • Cough    • Current every day smoker    • Depression    • Encounter for smoking cessation counseling    • Hematuria    • High risk medication use    • Hyperglycemia    • Hyperlipidemia    • Irritability and anger    • Lymphoma (CMS/HCC)    • Proteinuria    • Sciatica    • Skin benign neoplasm    • Visual changes        Past Surgical History:   Procedure Laterality Date   • KNEE SURGERY Left    • KNEE SURGERY     • LYMPH NODE BIOPSY         Family History   Problem Relation Age of Onset   • Heart attack Father    • No Known Problems Other        Social History     Socioeconomic History   • Marital status:      Spouse name: Not on file   • Number of children: Not on file   • Years of education: Not on file   • Highest education level: Not on file   Tobacco Use   • Smoking status: Current Some Day Smoker   • Smokeless tobacco: Never Used   Substance and Sexual Activity   • Alcohol use: Yes   • Drug use: No   • Sexual activity: Yes        Current Outpatient Medications on File Prior to Visit   Medication Sig Dispense Refill   • diazePAM (Valium) 5 MG tablet Take 1 tablet by mouth At Night As Needed for Anxiety. 30 tablet 3   • [DISCONTINUED] buPROPion XL (Wellbutrin XL) 150 MG 24 hr tablet Take 1 tablet by mouth Daily. 90 tablet 3   • [DISCONTINUED] Pitavastatin Calcium (Livalo) 4 MG tablet Take 1 tablet by mouth Every Night. 90 tablet 11     No current facility-administered medications on file prior to visit.        Review of Systems   Constitutional: Negative for fatigue.   Eyes: Negative for blurred vision.   Respiratory: Negative for cough, chest tightness and shortness of breath.    Cardiovascular: Negative for chest pain, palpitations and leg swelling.   Neurological: Negative for dizziness, light-headedness and headache.   Psychiatric/Behavioral: Positive for depressed mood.       Recent Results (from the past 4704 hour(s))   Comprehensive Metabolic Panel    Collection Time: 03/06/20  8:50 AM    Specimen: Blood   Result Value Ref Range    Glucose 119 (H) 65 - 99 mg/dL    BUN 14 8 - 23 mg/dL    Creatinine 1.15 0.76 - 1.27 mg/dL    eGFR Non African Am 65 >60 mL/min/1.73    eGFR African Am 79 >60 mL/min/1.73    BUN/Creatinine Ratio 12.2 7.0 - 25.0    Sodium 144 136 - 145 mmol/L    Potassium 5.1 3.5 - 5.2 mmol/L    Chloride 104 98 - 107 mmol/L    Total CO2 26.6 22.0 - 29.0 mmol/L    Calcium 9.5 8.6 - 10.5 mg/dL    Total Protein 7.0 6.0 - 8.5 g/dL    Albumin 4.50 3.50 - 5.20 g/dL    Globulin 2.5 gm/dL    A/G Ratio 1.8 g/dL    Total Bilirubin 0.4 0.2 - 1.2 mg/dL    Alkaline Phosphatase 138 (H) 39 - 117 U/L    AST (SGOT) 19 1 - 40 U/L    ALT (SGPT) 21 1 - 41 U/L   Lipid Panel    Collection Time: 03/06/20  8:50 AM    Specimen: Blood   Result Value Ref Range    Total Cholesterol 165 0 - 200 mg/dL    Triglycerides 105 0 - 150 mg/dL    HDL Cholesterol 43 40 - 60 mg/dL    VLDL Cholesterol 21 mg/dL    LDL Cholesterol  101 (H) 0 - 100 mg/dL    Comprehensive Metabolic Panel    Collection Time: 07/14/20  9:06 AM    Specimen: Blood   Result Value Ref Range    Glucose 112 (H) 65 - 99 mg/dL    BUN 13 8 - 23 mg/dL    Creatinine 1.20 0.76 - 1.27 mg/dL    eGFR Non African Am 62 >60 mL/min/1.73    eGFR African Am 75 >60 mL/min/1.73    BUN/Creatinine Ratio 10.8 7.0 - 25.0    Sodium 141 136 - 145 mmol/L    Potassium 4.4 3.5 - 5.2 mmol/L    Chloride 104 98 - 107 mmol/L    Total CO2 27.7 22.0 - 29.0 mmol/L    Calcium 9.1 8.6 - 10.5 mg/dL    Total Protein 6.5 6.0 - 8.5 g/dL    Albumin 4.60 3.50 - 5.20 g/dL    Globulin 1.9 gm/dL    A/G Ratio 2.4 g/dL    Total Bilirubin 0.5 0.0 - 1.2 mg/dL    Alkaline Phosphatase 132 (H) 39 - 117 U/L    AST (SGOT) 16 1 - 40 U/L    ALT (SGPT) 16 1 - 41 U/L   Lipid Panel    Collection Time: 07/14/20  9:06 AM    Specimen: Blood   Result Value Ref Range    Total Cholesterol 175 0 - 200 mg/dL    Triglycerides 109 0 - 150 mg/dL    HDL Cholesterol 40 40 - 60 mg/dL    VLDL Cholesterol 21.8 mg/dL    LDL Cholesterol  113 (H) 0 - 100 mg/dL   CBC & Differential    Collection Time: 07/14/20  9:06 AM    Specimen: Blood   Result Value Ref Range    WBC 4.93 3.40 - 10.80 10*3/mm3    RBC 5.08 4.14 - 5.80 10*6/mm3    Hemoglobin 16.0 13.0 - 17.7 g/dL    Hematocrit 46.1 37.5 - 51.0 %    MCV 90.7 79.0 - 97.0 fL    MCH 31.5 26.6 - 33.0 pg    MCHC 34.7 31.5 - 35.7 g/dL    RDW 13.1 12.3 - 15.4 %    Platelets 163 140 - 450 10*3/mm3    Neutrophil Rel % 67.3 42.7 - 76.0 %    Lymphocyte Rel % 19.9 19.6 - 45.3 %    Monocyte Rel % 7.5 5.0 - 12.0 %    Eosinophil Rel % 3.9 0.3 - 6.2 %    Basophil Rel % 1.2 0.0 - 1.5 %    Neutrophils Absolute 3.32 1.70 - 7.00 10*3/mm3    Lymphocytes Absolute 0.98 0.70 - 3.10 10*3/mm3    Monocytes Absolute 0.37 0.10 - 0.90 10*3/mm3    Eosinophils Absolute 0.19 0.00 - 0.40 10*3/mm3    Basophils Absolute 0.06 0.00 - 0.20 10*3/mm3    Immature Granulocyte Rel % 0.2 0.0 - 0.5 %    Immature Grans Absolute 0.01 0.00 - 0.05 10*3/mm3    nRBC 0.0  "0.0 - 0.2 /100 WBC     Objective   Vitals:    09/14/20 0814   BP: 118/78   Pulse: 85   Temp: 98 °F (36.7 °C)   SpO2: 98%   Weight: 85.3 kg (188 lb)   Height: 177.8 cm (70\")     Body mass index is 26.98 kg/m².  Physical Exam  Vitals signs and nursing note reviewed.   Constitutional:       General: He is not in acute distress.     Appearance: He is well-developed. He is not diaphoretic.   Cardiovascular:      Rate and Rhythm: Normal rate and regular rhythm.   Pulmonary:      Effort: Pulmonary effort is normal. No respiratory distress.      Breath sounds: Normal breath sounds. No wheezing.           Srinivasa was seen today for follow up and med refill.    Diagnoses and all orders for this visit:    Hyperglycemia  -     Hemoglobin A1c    Depression, unspecified depression type  -     buPROPion XL (Wellbutrin XL) 150 MG 24 hr tablet; Take 1 tablet by mouth 2 (two) times a day.    Mixed hyperlipidemia  -     rosuvastatin (Crestor) 20 MG tablet; Take 1 tablet by mouth Daily.    Health maintenance examination  -     Fluarix/Fluzone/Afluria Quad>6 Months    Immunization due  -     Fluarix/Fluzone/Afluria Quad>6 Months    Smoker  -     CT Chest Low Dose Wo; Future    Heavy cigarette smoker  -     CT Chest Low Dose Wo; Future      Return in about 3 months (around 12/14/2020) for HYPERLIPIDEMIA, depression.          "

## 2020-09-15 LAB
HBA1C MFR BLD: 5.7 % (ref 4.8–5.6)
LDH SERPL-CCNC: 183 U/L (ref 135–225)

## 2020-10-12 DIAGNOSIS — F41.9 ANXIETY: ICD-10-CM

## 2020-10-13 RX ORDER — DIAZEPAM 5 MG/1
TABLET ORAL
Qty: 30 TABLET | Refills: 2 | Status: SHIPPED | OUTPATIENT
Start: 2020-10-13 | End: 2020-11-19

## 2020-11-10 DIAGNOSIS — E78.2 MIXED HYPERLIPIDEMIA: ICD-10-CM

## 2020-11-10 DIAGNOSIS — F41.9 ANXIETY: ICD-10-CM

## 2020-11-10 RX ORDER — ROSUVASTATIN CALCIUM 20 MG/1
20 TABLET, COATED ORAL DAILY
Qty: 90 TABLET | Refills: 3 | Status: CANCELLED | OUTPATIENT
Start: 2020-11-10

## 2020-11-10 NOTE — TELEPHONE ENCOUNTER
Caller: Srinivasa Paz    Relationship: Self    Best call back number: [phone]   CELL     Medication needed:   Requested Prescriptions     Pending Prescriptions Disp Refills   • diazePAM (VALIUM) 5 MG tablet 30 tablet 2     Sig: Take 1 tablet by mouth At Night As Needed for Anxiety.   • rosuvastatin (Crestor) 20 MG tablet 90 tablet 3     Sig: Take 1 tablet by mouth Daily.       When do you need the refill by: ASAP    What details did the patient provide when requesting the medication: PT IS REQUESTING A 90 DAY SUPPLY ON BOTH MEDICATIONS     Does the patient have less than a 3 day supply:  [x] Yes  [] No    What is the patient's preferred pharmacy:    [address]  [phone]

## 2020-11-11 RX ORDER — DIAZEPAM 5 MG/1
5 TABLET ORAL NIGHTLY PRN
Qty: 30 TABLET | Refills: 2 | OUTPATIENT
Start: 2020-11-11

## 2020-11-11 RX ORDER — ROSUVASTATIN CALCIUM 20 MG/1
20 TABLET, COATED ORAL DAILY
Qty: 90 TABLET | Refills: 3 | OUTPATIENT
Start: 2020-11-11

## 2020-11-12 ENCOUNTER — TELEPHONE (OUTPATIENT)
Dept: FAMILY MEDICINE CLINIC | Facility: CLINIC | Age: 60
End: 2020-11-12

## 2020-11-12 DIAGNOSIS — E78.2 MIXED HYPERLIPIDEMIA: Primary | ICD-10-CM

## 2020-11-12 RX ORDER — ATORVASTATIN CALCIUM 20 MG/1
20 TABLET, FILM COATED ORAL DAILY
Qty: 90 TABLET | Refills: 3 | Status: SHIPPED | OUTPATIENT
Start: 2020-11-12 | End: 2020-12-15

## 2020-11-12 RX ORDER — DIAZEPAM 5 MG/1
TABLET ORAL
Qty: 30 TABLET | Refills: 2 | OUTPATIENT
Start: 2020-11-12

## 2020-11-12 NOTE — TELEPHONE ENCOUNTER
Caller: Srinivasa Paz    Relationship: Self    Best call back number: [phone]    What medication are you requesting: LIPITOR      If a prescription is needed, what is your preferred pharmacy and phone number:   [facility]/pharmacy #[id number] - [place], [place] - [address]  - [phone] [facility] [phone] [facility]   Additional notes:  PATIENT REQUESTING a 90 DAY SUPPLY OF LIPITOR

## 2020-11-12 NOTE — TELEPHONE ENCOUNTER
Spoke with patient, edwige does not have insurance and cannot afford crestor at this time pharamcist said he could fill lipator for $8, patient asking for a change in medication

## 2020-11-19 ENCOUNTER — TELEMEDICINE (OUTPATIENT)
Dept: FAMILY MEDICINE CLINIC | Facility: CLINIC | Age: 60
End: 2020-11-19

## 2020-11-19 ENCOUNTER — TELEPHONE (OUTPATIENT)
Dept: FAMILY MEDICINE CLINIC | Facility: CLINIC | Age: 60
End: 2020-11-19

## 2020-11-19 DIAGNOSIS — F51.01 PRIMARY INSOMNIA: Primary | ICD-10-CM

## 2020-11-19 DIAGNOSIS — F32.A DEPRESSION, UNSPECIFIED DEPRESSION TYPE: ICD-10-CM

## 2020-11-19 PROCEDURE — 99442 PR PHYS/QHP TELEPHONE EVALUATION 11-20 MIN: CPT | Performed by: FAMILY MEDICINE

## 2020-11-19 RX ORDER — BUPROPION HYDROCHLORIDE 300 MG/1
300 TABLET ORAL EVERY MORNING
Qty: 30 TABLET | Refills: 6 | Status: SHIPPED | OUTPATIENT
Start: 2020-11-19 | End: 2020-12-15 | Stop reason: SDUPTHER

## 2020-11-19 NOTE — PROGRESS NOTES
Mary Jo Paz is a 60 y.o. male.     Consent given    Time 20 min    Visit via Doxim[employer]  Unable to complete visit using a video connection to the patient. A phone visit was used to complete this visits. Total time of discussion was 20 minutes.    CC: pt does not want to cont. Valium , can not sleep    Insomnia  This is a recurrent problem. The current episode started 1 to 4 weeks ago. The problem occurs constantly. The problem has been rapidly worsening. Associated symptoms include fatigue. Treatments tried: valium.    stop Wellbutrin at night  Stop Valium      The following portions of the patient's history were reviewed and updated as appropriate: allergies, current medications, past family history, past medical history, past social history, past surgical history and problem list.    Past Medical History:   Diagnosis Date   • Abdominal pain, LLQ (left lower quadrant)    • Adverse reaction to drug    • Anxiety    • Bone pain    • Chest pain, midsternal    • Cough    • Current every day smoker    • Depression    • Encounter for smoking cessation counseling    • Hematuria    • High risk medication use    • Hyperglycemia    • Hyperlipidemia    • Irritability and anger    • Lymphoma (CMS/HCC)    • Proteinuria    • Sciatica    • Skin benign neoplasm    • Visual changes        Past Surgical History:   Procedure Laterality Date   • KNEE SURGERY Left    • KNEE SURGERY     • LYMPH NODE BIOPSY         Family History   Problem Relation Age of Onset   • Heart attack Father    • No Known Problems Other        Social History     Socioeconomic History   • Marital status:      Spouse name: Not on file   • Number of children: Not on file   • Years of education: Not on file   • Highest education level: Not on file   Tobacco Use   • Smoking status: Current Some Day Smoker   • Smokeless tobacco: Never Used   Substance and Sexual Activity   • Alcohol use: Yes   • Drug use: No   • Sexual activity: Yes       Current  Outpatient Medications on File Prior to Visit   Medication Sig Dispense Refill   • atorvastatin (Lipitor) 20 MG tablet Take 1 tablet by mouth Daily. 90 tablet 3   • [DISCONTINUED] buPROPion XL (Wellbutrin XL) 150 MG 24 hr tablet Take 1 tablet by mouth 2 (two) times a day. 180 tablet 3   • [DISCONTINUED] diazePAM (VALIUM) 5 MG tablet TAKE ONE TABLET BY MOUTH AT NIGHT AS NEEDED FOR ANXIETY 30 tablet 2     No current facility-administered medications on file prior to visit.        Review of Systems   Constitutional: Positive for fatigue.   Psychiatric/Behavioral: Positive for sleep disturbance and depressed mood. The patient has insomnia.        Recent Results (from the past 4704 hour(s))   Comprehensive Metabolic Panel    Collection Time: 07/14/20  9:06 AM    Specimen: Blood   Result Value Ref Range    Glucose 112 (H) 65 - 99 mg/dL    BUN 13 8 - 23 mg/dL    Creatinine 1.20 0.76 - 1.27 mg/dL    eGFR Non African Am 62 >60 mL/min/1.73    eGFR African Am 75 >60 mL/min/1.73    BUN/Creatinine Ratio 10.8 7.0 - 25.0    Sodium 141 136 - 145 mmol/L    Potassium 4.4 3.5 - 5.2 mmol/L    Chloride 104 98 - 107 mmol/L    Total CO2 27.7 22.0 - 29.0 mmol/L    Calcium 9.1 8.6 - 10.5 mg/dL    Total Protein 6.5 6.0 - 8.5 g/dL    Albumin 4.60 3.50 - 5.20 g/dL    Globulin 1.9 gm/dL    A/G Ratio 2.4 g/dL    Total Bilirubin 0.5 0.0 - 1.2 mg/dL    Alkaline Phosphatase 132 (H) 39 - 117 U/L    AST (SGOT) 16 1 - 40 U/L    ALT (SGPT) 16 1 - 41 U/L   Lipid Panel    Collection Time: 07/14/20  9:06 AM    Specimen: Blood   Result Value Ref Range    Total Cholesterol 175 0 - 200 mg/dL    Triglycerides 109 0 - 150 mg/dL    HDL Cholesterol 40 40 - 60 mg/dL    VLDL Cholesterol 21.8 mg/dL    LDL Cholesterol  113 (H) 0 - 100 mg/dL   CBC & Differential    Collection Time: 07/14/20  9:06 AM    Specimen: Blood   Result Value Ref Range    WBC 4.93 3.40 - 10.80 10*3/mm3    RBC 5.08 4.14 - 5.80 10*6/mm3    Hemoglobin 16.0 13.0 - 17.7 g/dL    Hematocrit 46.1 37.5  - 51.0 %    MCV 90.7 79.0 - 97.0 fL    MCH 31.5 26.6 - 33.0 pg    MCHC 34.7 31.5 - 35.7 g/dL    RDW 13.1 12.3 - 15.4 %    Platelets 163 140 - 450 10*3/mm3    Neutrophil Rel % 67.3 42.7 - 76.0 %    Lymphocyte Rel % 19.9 19.6 - 45.3 %    Monocyte Rel % 7.5 5.0 - 12.0 %    Eosinophil Rel % 3.9 0.3 - 6.2 %    Basophil Rel % 1.2 0.0 - 1.5 %    Neutrophils Absolute 3.32 1.70 - 7.00 10*3/mm3    Lymphocytes Absolute 0.98 0.70 - 3.10 10*3/mm3    Monocytes Absolute 0.37 0.10 - 0.90 10*3/mm3    Eosinophils Absolute 0.19 0.00 - 0.40 10*3/mm3    Basophils Absolute 0.06 0.00 - 0.20 10*3/mm3    Immature Granulocyte Rel % 0.2 0.0 - 0.5 %    Immature Grans Absolute 0.01 0.00 - 0.05 10*3/mm3    nRBC 0.0 0.0 - 0.2 /100 WBC   Hemoglobin A1c    Collection Time: 09/14/20  9:04 AM    Specimen: Blood   Result Value Ref Range    Hemoglobin A1C 5.70 (H) 4.80 - 5.60 %   Lactate Dehydrogenase    Collection Time: 09/14/20  9:04 AM   Result Value Ref Range     135 - 225 U/L     Objective   There were no vitals filed for this visit.  There is no height or weight on file to calculate BMI.  Physical Exam      Diagnoses and all orders for this visit:    1. Primary insomnia (Primary)  -     triazolam (HALCION) 0.25 MG tablet; Take 1 tablet by mouth At Night As Needed for Sleep.  Dispense: 30 tablet; Refill: 2    2. Depression, unspecified depression type  -     buPROPion XL (WELLBUTRIN XL) 300 MG 24 hr tablet; Take 1 tablet by mouth Every Morning.  Dispense: 30 tablet; Refill: 6    d/c Valium  Pt will start Wellbutrin 300 mg QAM     follow up 12/15/2020

## 2020-11-19 NOTE — TELEPHONE ENCOUNTER
PATIENT STATES: that he would like to get off  diazePAM (VALIUM) 5 MG tablet because the med is making him feel funny he says. So he would like to get off the med and try something else please advise     PATIENT CAN BE REACHED ON:[phone]    PHARMACY PREFERRED:[facility] [address] [place] [phone] [facility] (GERALDINE APONTE) - [phone]  - [phone] FX

## 2020-12-15 ENCOUNTER — OFFICE VISIT (OUTPATIENT)
Dept: FAMILY MEDICINE CLINIC | Facility: CLINIC | Age: 60
End: 2020-12-15

## 2020-12-15 VITALS
WEIGHT: 177 LBS | TEMPERATURE: 98.4 F | OXYGEN SATURATION: 98 % | HEART RATE: 73 BPM | HEIGHT: 70 IN | SYSTOLIC BLOOD PRESSURE: 120 MMHG | DIASTOLIC BLOOD PRESSURE: 74 MMHG | BODY MASS INDEX: 25.34 KG/M2

## 2020-12-15 DIAGNOSIS — F51.01 PRIMARY INSOMNIA: ICD-10-CM

## 2020-12-15 DIAGNOSIS — E78.2 MIXED HYPERLIPIDEMIA: Primary | ICD-10-CM

## 2020-12-15 DIAGNOSIS — R73.03 PREDIABETES: ICD-10-CM

## 2020-12-15 DIAGNOSIS — E55.9 VITAMIN D DEFICIENCY: ICD-10-CM

## 2020-12-15 DIAGNOSIS — I73.9 CLAUDICATION (HCC): ICD-10-CM

## 2020-12-15 DIAGNOSIS — R53.82 CHRONIC FATIGUE: ICD-10-CM

## 2020-12-15 DIAGNOSIS — F32.A DEPRESSION, UNSPECIFIED DEPRESSION TYPE: ICD-10-CM

## 2020-12-15 PROCEDURE — 99214 OFFICE O/P EST MOD 30 MIN: CPT | Performed by: FAMILY MEDICINE

## 2020-12-15 RX ORDER — DIAZEPAM 5 MG/1
TABLET ORAL
COMMUNITY
Start: 2020-07-17 | End: 2020-12-15

## 2020-12-15 RX ORDER — BUPROPION HYDROCHLORIDE 450 MG/1
450 TABLET, FILM COATED, EXTENDED RELEASE ORAL EVERY MORNING
Qty: 90 TABLET | Refills: 3 | Status: SHIPPED | OUTPATIENT
Start: 2020-12-15 | End: 2020-12-19

## 2020-12-15 NOTE — PROGRESS NOTES
"Subjective   Srinivasa Paz is a 60 y.o. male.     Chief Complaint   Patient presents with   • Diabetes   • Foot pain     pain will go up leg- burning in feet and tingling       History of Present Illness   Patient is back today with multiple complaints.  Follow-up on depression.  He does not feel much better on a new medication Wellbutrin 300 mg at this time.  He appears to be very depressed with flat affect.      Follow-up on hyperlipidemia.  He cannot tolerate Lipitor once again due to bilateral lower extremity pains.    Complains of bilateral lower extremities with cold sensation in his toes and \"\" burning and tingling on his feet as well.    Insomnia is not improving on triazolam 0.25mg.  We will adjust medications    Diabetes follow-up.  At this time he is not checking his blood sugars or taking any medications his last A1c was 5.8  3 months ago  .  The following portions of the patient's history were reviewed and updated as appropriate: allergies, current medications, past family history, past medical history, past social history, past surgical history and problem list.    Past Medical History:   Diagnosis Date   • Abdominal pain, LLQ (left lower quadrant)    • Adverse reaction to drug    • Anxiety    • Bone pain    • Chest pain, midsternal    • Cough    • Current every day smoker    • Depression    • Encounter for smoking cessation counseling    • Hematuria    • High risk medication use    • Hyperglycemia    • Hyperlipidemia    • Irritability and anger    • Lymphoma (CMS/HCC)    • Proteinuria    • Sciatica    • Skin benign neoplasm    • Visual changes        Past Surgical History:   Procedure Laterality Date   • KNEE SURGERY Left    • KNEE SURGERY     • LYMPH NODE BIOPSY         Family History   Problem Relation Age of Onset   • Heart attack Father    • No Known Problems Other        Social History     Socioeconomic History   • Marital status:      Spouse name: Not on file   • Number of children: Not on " file   • Years of education: Not on file   • Highest education level: Not on file   Tobacco Use   • Smoking status: Former Smoker     Quit date: 9/15/2020     Years since quittin.2   • Smokeless tobacco: Never Used   Substance and Sexual Activity   • Alcohol use: Not Currently     Comment: sober for 20 yrs   • Drug use: No   • Sexual activity: Yes       Current Outpatient Medications on File Prior to Visit   Medication Sig Dispense Refill   • [DISCONTINUED] atorvastatin (Lipitor) 20 MG tablet Take 1 tablet by mouth Daily. (Patient taking differently: Take 10 mg by mouth Daily.) 90 tablet 3   • [DISCONTINUED] buPROPion XL (WELLBUTRIN XL) 300 MG 24 hr tablet Take 1 tablet by mouth Every Morning. 30 tablet 6   • [DISCONTINUED] diazePAM (VALIUM) 5 MG tablet      • [DISCONTINUED] triazolam (HALCION) 0.25 MG tablet Take 1 tablet by mouth At Night As Needed for Sleep. 30 tablet 2     No current facility-administered medications on file prior to visit.        Review of Systems   Constitutional: Positive for fatigue.   Musculoskeletal: Positive for arthralgias and myalgias.   Psychiatric/Behavioral: Positive for sleep disturbance and depressed mood. The patient is nervous/anxious.        Recent Results (from the past 4704 hour(s))   Comprehensive Metabolic Panel    Collection Time: 20  9:06 AM    Specimen: Blood   Result Value Ref Range    Glucose 112 (H) 65 - 99 mg/dL    BUN 13 8 - 23 mg/dL    Creatinine 1.20 0.76 - 1.27 mg/dL    eGFR Non African Am 62 >60 mL/min/1.73    eGFR African Am 75 >60 mL/min/1.73    BUN/Creatinine Ratio 10.8 7.0 - 25.0    Sodium 141 136 - 145 mmol/L    Potassium 4.4 3.5 - 5.2 mmol/L    Chloride 104 98 - 107 mmol/L    Total CO2 27.7 22.0 - 29.0 mmol/L    Calcium 9.1 8.6 - 10.5 mg/dL    Total Protein 6.5 6.0 - 8.5 g/dL    Albumin 4.60 3.50 - 5.20 g/dL    Globulin 1.9 gm/dL    A/G Ratio 2.4 g/dL    Total Bilirubin 0.5 0.0 - 1.2 mg/dL    Alkaline Phosphatase 132 (H) 39 - 117 U/L    AST (SGOT)  "16 1 - 40 U/L    ALT (SGPT) 16 1 - 41 U/L   Lipid Panel    Collection Time: 07/14/20  9:06 AM    Specimen: Blood   Result Value Ref Range    Total Cholesterol 175 0 - 200 mg/dL    Triglycerides 109 0 - 150 mg/dL    HDL Cholesterol 40 40 - 60 mg/dL    VLDL Cholesterol 21.8 mg/dL    LDL Cholesterol  113 (H) 0 - 100 mg/dL   CBC & Differential    Collection Time: 07/14/20  9:06 AM    Specimen: Blood   Result Value Ref Range    WBC 4.93 3.40 - 10.80 10*3/mm3    RBC 5.08 4.14 - 5.80 10*6/mm3    Hemoglobin 16.0 13.0 - 17.7 g/dL    Hematocrit 46.1 37.5 - 51.0 %    MCV 90.7 79.0 - 97.0 fL    MCH 31.5 26.6 - 33.0 pg    MCHC 34.7 31.5 - 35.7 g/dL    RDW 13.1 12.3 - 15.4 %    Platelets 163 140 - 450 10*3/mm3    Neutrophil Rel % 67.3 42.7 - 76.0 %    Lymphocyte Rel % 19.9 19.6 - 45.3 %    Monocyte Rel % 7.5 5.0 - 12.0 %    Eosinophil Rel % 3.9 0.3 - 6.2 %    Basophil Rel % 1.2 0.0 - 1.5 %    Neutrophils Absolute 3.32 1.70 - 7.00 10*3/mm3    Lymphocytes Absolute 0.98 0.70 - 3.10 10*3/mm3    Monocytes Absolute 0.37 0.10 - 0.90 10*3/mm3    Eosinophils Absolute 0.19 0.00 - 0.40 10*3/mm3    Basophils Absolute 0.06 0.00 - 0.20 10*3/mm3    Immature Granulocyte Rel % 0.2 0.0 - 0.5 %    Immature Grans Absolute 0.01 0.00 - 0.05 10*3/mm3    nRBC 0.0 0.0 - 0.2 /100 WBC   Hemoglobin A1c    Collection Time: 09/14/20  9:04 AM    Specimen: Blood   Result Value Ref Range    Hemoglobin A1C 5.70 (H) 4.80 - 5.60 %   Lactate Dehydrogenase    Collection Time: 09/14/20  9:04 AM   Result Value Ref Range     135 - 225 U/L     Objective   Vitals:    12/15/20 0859   BP: 120/74   BP Location: Right arm   Patient Position: Sitting   Cuff Size: Large Adult   Pulse: 73   Temp: 98.4 °F (36.9 °C)   TempSrc: Infrared   SpO2: 98%   Weight: 80.3 kg (177 lb)   Height: 177.8 cm (70\")     Body mass index is 25.4 kg/m².  Physical Exam  Vitals signs and nursing note reviewed.   Constitutional:       General: He is not in acute distress.     Appearance: He is " well-developed. He is not diaphoretic.   Cardiovascular:      Rate and Rhythm: Normal rate and regular rhythm.   Pulmonary:      Effort: Pulmonary effort is normal. No respiratory distress.      Breath sounds: Normal breath sounds. No wheezing.   Musculoskeletal:      Comments: Bilateral toes feel cold.  He has markedly decreased hair on bilateral shins.  I did appreciate bilateral pedal pulses   Neurological:      General: No focal deficit present.   Psychiatric:      Comments: Depressed mood.           Diagnoses and all orders for this visit:    1. Mixed hyperlipidemia (Primary)  -     pitavastatin calcium (Livalo) 2 MG tablet tablet; Take 1 tablet by mouth Every Night.  Dispense: 90 tablet; Refill: 11  -     Comprehensive Metabolic Panel  -     Lipid Panel With LDL / HDL Ratio    2. Claudication (CMS/HCC)  -     Doppler Arterial Multi Level Lower Extremity - Bilateral CAR; Future    3. Prediabetes  -     Hemoglobin A1c  -     Comprehensive Metabolic Panel  -     Lipid Panel With LDL / HDL Ratio    4. Depression, unspecified depression type  -     buPROPion XL (FORFIVO XL) 450 MG 24 hr tablet; Take 1 tablet by mouth Every Morning.  Dispense: 90 tablet; Refill: 3    5. Primary insomnia  -     triazolam (HALCION) 0.25 MG tablet; Take 2 pills po qhs  Dispense: 60 tablet; Refill: 2    6. Vitamin D deficiency  -     Vitamin D 25 Hydroxy    7. Chronic fatigue  -     Vitamin B12 & Folate      Return in about 6 weeks (around 1/26/2021) for depression.

## 2020-12-16 ENCOUNTER — PATIENT MESSAGE (OUTPATIENT)
Dept: FAMILY MEDICINE CLINIC | Facility: CLINIC | Age: 60
End: 2020-12-16

## 2020-12-16 DIAGNOSIS — E53.8 FOLIC ACID DEFICIENCY: Primary | ICD-10-CM

## 2020-12-16 DIAGNOSIS — F33.2 SEVERE EPISODE OF RECURRENT MAJOR DEPRESSIVE DISORDER, WITHOUT PSYCHOTIC FEATURES (HCC): Primary | ICD-10-CM

## 2020-12-16 LAB
25(OH)D3+25(OH)D2 SERPL-MCNC: 32.6 NG/ML (ref 30–100)
ALBUMIN SERPL-MCNC: 4.5 G/DL (ref 3.5–5.2)
ALBUMIN/GLOB SERPL: 2 G/DL
ALP SERPL-CCNC: 139 U/L (ref 39–117)
ALT SERPL-CCNC: 25 U/L (ref 1–41)
AST SERPL-CCNC: 17 U/L (ref 1–40)
BILIRUB SERPL-MCNC: 0.6 MG/DL (ref 0–1.2)
BUN SERPL-MCNC: 15 MG/DL (ref 8–23)
BUN/CREAT SERPL: 12.3 (ref 7–25)
CALCIUM SERPL-MCNC: 9.4 MG/DL (ref 8.6–10.5)
CHLORIDE SERPL-SCNC: 103 MMOL/L (ref 98–107)
CHOLEST SERPL-MCNC: 154 MG/DL (ref 0–200)
CO2 SERPL-SCNC: 28.9 MMOL/L (ref 22–29)
CREAT SERPL-MCNC: 1.22 MG/DL (ref 0.76–1.27)
FOLATE SERPL-MCNC: 4.42 NG/ML (ref 4.78–24.2)
GLOBULIN SER CALC-MCNC: 2.2 GM/DL
GLUCOSE SERPL-MCNC: 106 MG/DL (ref 65–99)
HBA1C MFR BLD: 5.6 % (ref 4.8–5.6)
HDLC SERPL-MCNC: 51 MG/DL (ref 40–60)
LDLC SERPL CALC-MCNC: 86 MG/DL (ref 0–100)
LDLC/HDLC SERPL: 1.67 {RATIO}
POTASSIUM SERPL-SCNC: 4.8 MMOL/L (ref 3.5–5.2)
PROT SERPL-MCNC: 6.7 G/DL (ref 6–8.5)
SODIUM SERPL-SCNC: 139 MMOL/L (ref 136–145)
TRIGL SERPL-MCNC: 89 MG/DL (ref 0–150)
VIT B12 SERPL-MCNC: 384 PG/ML (ref 211–946)
VLDLC SERPL CALC-MCNC: 17 MG/DL (ref 5–40)

## 2020-12-16 RX ORDER — FOLIC ACID 1 MG/1
1 TABLET ORAL DAILY
Qty: 90 TABLET | Refills: 3 | Status: SHIPPED | OUTPATIENT
Start: 2020-12-16 | End: 2021-06-10

## 2020-12-19 RX ORDER — BUPROPION HYDROCHLORIDE 300 MG/1
300 TABLET ORAL DAILY
Qty: 90 TABLET | Refills: 3 | Status: SHIPPED | OUTPATIENT
Start: 2020-12-19 | End: 2021-03-11 | Stop reason: SDUPTHER

## 2020-12-19 RX ORDER — BUPROPION HYDROCHLORIDE 150 MG/1
150 TABLET ORAL DAILY
Qty: 90 TABLET | Refills: 3 | Status: SHIPPED | OUTPATIENT
Start: 2020-12-19 | End: 2021-01-11

## 2021-01-08 ENCOUNTER — APPOINTMENT (OUTPATIENT)
Dept: CARDIOLOGY | Facility: HOSPITAL | Age: 61
End: 2021-01-08

## 2021-01-08 ENCOUNTER — HOSPITAL ENCOUNTER (OUTPATIENT)
Dept: CARDIOLOGY | Facility: HOSPITAL | Age: 61
Discharge: HOME | End: 2021-01-08
Admitting: FAMILY MEDICINE

## 2021-01-08 DIAGNOSIS — I73.9 CLAUDICATION (HCC): ICD-10-CM

## 2021-01-08 LAB
BH CV LOWER ARTERIAL LEFT ABI RATIO: 1.12
BH CV LOWER ARTERIAL LEFT DORSALIS PEDIS SYS MAX: 147 MMHG
BH CV LOWER ARTERIAL LEFT GREAT TOE SYS MAX: 133 MMHG
BH CV LOWER ARTERIAL LEFT POST TIBIAL SYS MAX: 147 MMHG
BH CV LOWER ARTERIAL LEFT TBI RATIO: 1.02
BH CV LOWER ARTERIAL RIGHT ABI RATIO: 1.15
BH CV LOWER ARTERIAL RIGHT DORSALIS PEDIS SYS MAX: 144 MMHG
BH CV LOWER ARTERIAL RIGHT GREAT TOE SYS MAX: 136 MMHG
BH CV LOWER ARTERIAL RIGHT POST TIBIAL SYS MAX: 150 MMHG
BH CV LOWER ARTERIAL RIGHT TBI RATIO: 1.04
UPPER ARTERIAL LEFT ARM BRACHIAL SYS MAX: 131 MMHG
UPPER ARTERIAL RIGHT ARM BRACHIAL SYS MAX: 126 MMHG

## 2021-01-08 PROCEDURE — 93922 UPR/L XTREMITY ART 2 LEVELS: CPT

## 2021-03-11 ENCOUNTER — OFFICE VISIT (OUTPATIENT)
Dept: FAMILY MEDICINE CLINIC | Facility: CLINIC | Age: 61
End: 2021-03-11

## 2021-03-11 VITALS
DIASTOLIC BLOOD PRESSURE: 77 MMHG | TEMPERATURE: 98.2 F | OXYGEN SATURATION: 98 % | HEIGHT: 70 IN | WEIGHT: 173 LBS | BODY MASS INDEX: 24.77 KG/M2 | HEART RATE: 65 BPM | SYSTOLIC BLOOD PRESSURE: 132 MMHG

## 2021-03-11 DIAGNOSIS — F33.2 SEVERE EPISODE OF RECURRENT MAJOR DEPRESSIVE DISORDER, WITHOUT PSYCHOTIC FEATURES (HCC): ICD-10-CM

## 2021-03-11 DIAGNOSIS — Z00.00 MEDICARE ANNUAL WELLNESS VISIT, SUBSEQUENT: Primary | ICD-10-CM

## 2021-03-11 PROBLEM — Z51.81 ENCOUNTER FOR MONITORING CARDIOTOXIC DRUG THERAPY: Status: ACTIVE | Noted: 2017-05-02

## 2021-03-11 PROBLEM — Z85.79 HISTORY OF LYMPHOMA: Status: ACTIVE | Noted: 2019-07-10

## 2021-03-11 PROBLEM — Z85.72 HISTORY OF LYMPHOMA: Status: ACTIVE | Noted: 2019-07-10

## 2021-03-11 PROBLEM — Z79.899 ENCOUNTER FOR MONITORING CARDIOTOXIC DRUG THERAPY: Status: ACTIVE | Noted: 2017-05-02

## 2021-03-11 PROCEDURE — G0439 PPPS, SUBSEQ VISIT: HCPCS | Performed by: FAMILY MEDICINE

## 2021-03-11 RX ORDER — BUPROPION HYDROCHLORIDE 150 MG/1
150 TABLET ORAL DAILY
Qty: 30 TABLET | Refills: 11 | Status: SHIPPED | OUTPATIENT
Start: 2021-03-11 | End: 2022-03-25 | Stop reason: SDUPTHER

## 2021-03-11 RX ORDER — UBIDECARENONE 200 MG
CAPSULE ORAL
Status: ON HOLD | COMMUNITY
End: 2022-09-11

## 2021-03-11 NOTE — PROGRESS NOTES
The ABCs of the Annual Wellness Visit  Subsequent Medicare Wellness Visit    Chief Complaint   Patient presents with   • Follow-up   • labs       Subjective   History of Present Illness:  Srinivasa Paz is a 60 y.o. male who presents for a Subsequent Medicare Wellness Visit.    HEALTH RISK ASSESSMENT    Recent Hospitalizations:  No hospitalization(s) within the last year.    Current Medical Providers:  Patient Care Team:  Nita Gallegos MD as PCP - General (Family Medicine)    Smoking Status:  Social History     Tobacco Use   Smoking Status Former Smoker   • Quit date: 9/15/2020   • Years since quittin.4   Smokeless Tobacco Never Used       Alcohol Consumption:  Social History     Substance and Sexual Activity   Alcohol Use Not Currently    Comment: sober for 20 yrs       Depression Screen:   PHQ-2/PHQ-9 Depression Screening 3/6/2020   Little interest or pleasure in doing things 1   Feeling down, depressed, or hopeless 1   Total Score 2       Fall Risk Screen:  LINNETTE Fall Risk Assessment has not been completed.    Health Habits and Functional and Cognitive Screening:  Functional & Cognitive Status 3/6/2020   Do you have difficulty preparing food and eating? No   Do you have difficulty bathing yourself, getting dressed or grooming yourself? No   Do you have difficulty using the toilet? No   Do you have difficulty moving around from place to place? No   Do you have trouble with steps or getting out of a bed or a chair? No   Current Diet Well Balanced Diet   Dental Exam Up to date   Eye Exam Up to date   Exercise (times per week) 4 times per week   Current Exercise Activities Include Cardiovasular Workout on Exercise Equipment   Do you need help using the phone?  No   Are you deaf or do you have serious difficulty hearing?  No   Do you need help with transportation? No   Do you need help shopping? No   Do you need help preparing meals?  No   Do you need help with housework?  No   Do you need help with laundry? No    Do you need help taking your medications? No   Do you need help managing money? No   Do you ever drive or ride in a car without wearing a seat belt? No   Have you felt unusual stress, anger or loneliness in the last month? No   Who do you live with? Spouse   If you need help, do you have trouble finding someone available to you? No   Have you been bothered in the last four weeks by sexual problems? No   Do you have difficulty concentrating, remembering or making decisions? No         Does the patient have evidence of cognitive impairment? No    Asprin use counseling:Does not need ASA (and currently is not on it)    Age-appropriate Screening Schedule:  Refer to the list below for future screening recommendations based on patient's age, sex and/or medical conditions. Orders for these recommended tests are listed in the plan section. The patient has been provided with a written plan.    Health Maintenance   Topic Date Due   • ZOSTER VACCINE (2 of 2) 11/28/2019   • LIPID PANEL  12/15/2021   • COLONOSCOPY  12/08/2027   • TDAP/TD VACCINES (2 - Td) 05/21/2029   • INFLUENZA VACCINE  Completed          The following portions of the patient's history were reviewed and updated as appropriate: allergies, current medications, past family history, past medical history, past social history, past surgical history and problem list.    Outpatient Medications Prior to Visit   Medication Sig Dispense Refill   • buPROPion XL (Wellbutrin XL) 300 MG 24 hr tablet Take 1 tablet by mouth Daily. To take along with Wellbutrin 150 mg to a total dose 450mg 90 tablet 3   • Co-Enzyme Q10 200 MG capsule Take  by mouth.     • folic acid (FOLVITE) 1 MG tablet Take 1 tablet by mouth Daily. 90 tablet 3     No facility-administered medications prior to visit.       Patient Active Problem List   Diagnosis   • Medicare annual wellness visit, subsequent   • Hyperlipidemia   • Depression   • Anxiety   • Disorder of shoulder joint   • Encounter for  "monitoring cardiotoxic drug therapy   • Follicular lymphoma (CMS/HCC)   • History of lymphoma   • Hx antineoplastic chemotherapy   • Personal history of radiation exposure   • Pure hypercholesterolemia   • Tobacco use disorder       Advanced Care Planning:  ACP discussion was held with the patient during this visit. Patient does not have an advance directive, information provided.    Review of Systems    Compared to one year ago, the patient feels his physical health is worse.  Compared to one year ago, the patient feels his mental health is worse.    Reviewed chart for potential of high risk medication in the elderly: yes  Reviewed chart for potential of harmful drug interactions in the elderly:yes    Objective         Vitals:    03/11/21 0825   BP: 132/77   Pulse: 65   Temp: 98.2 °F (36.8 °C)   SpO2: 98%   Weight: 78.5 kg (173 lb)   Height: 177.8 cm (70\")       Body mass index is 24.82 kg/m².  Discussed the patient's BMI with him. The BMI is in the acceptable range.    Physical Exam    Lab Results   Component Value Date     (H) 02/12/2021    CHLPL 154 12/15/2020    TRIG 89 12/15/2020    HDL 51 12/15/2020    LDL 86 12/15/2020    VLDL 17 12/15/2020    HGBA1C 5.60 12/15/2020        Assessment/Plan   Medicare Risks and Personalized Health Plan  CMS Preventative Services Quick Reference  Fall Risk    The above risks/problems have been discussed with the patient.  Pertinent information has been shared with the patient in the After Visit Summary.  Follow up plans and orders are seen below in the Assessment/Plan Section.    Diagnoses and all orders for this visit:    1. Medicare annual wellness visit, subsequent (Primary)      Follow Up:  Return in about 1 year (around 3/11/2022) for Medicare Wellness.   Return in about 3 months (around 6/11/2021) for Medicare Wellness, depression.      An After Visit Summary and PPPS were given to the patient.             "

## 2021-06-10 ENCOUNTER — OFFICE VISIT (OUTPATIENT)
Dept: FAMILY MEDICINE CLINIC | Facility: CLINIC | Age: 61
End: 2021-06-10

## 2021-06-10 VITALS
OXYGEN SATURATION: 98 % | BODY MASS INDEX: 25.62 KG/M2 | WEIGHT: 179 LBS | TEMPERATURE: 97.7 F | HEIGHT: 70 IN | HEART RATE: 65 BPM | SYSTOLIC BLOOD PRESSURE: 119 MMHG | DIASTOLIC BLOOD PRESSURE: 75 MMHG

## 2021-06-10 DIAGNOSIS — R73.9 HYPERGLYCEMIA: ICD-10-CM

## 2021-06-10 DIAGNOSIS — E78.2 MIXED HYPERLIPIDEMIA: ICD-10-CM

## 2021-06-10 DIAGNOSIS — F33.2 SEVERE EPISODE OF RECURRENT MAJOR DEPRESSIVE DISORDER, WITHOUT PSYCHOTIC FEATURES (HCC): Primary | ICD-10-CM

## 2021-06-10 DIAGNOSIS — F41.9 ANXIETY: ICD-10-CM

## 2021-06-10 DIAGNOSIS — E53.8 FOLIC ACID DEFICIENCY: ICD-10-CM

## 2021-06-10 PROCEDURE — 99214 OFFICE O/P EST MOD 30 MIN: CPT | Performed by: FAMILY MEDICINE

## 2021-06-10 NOTE — PROGRESS NOTES
Mary Jo Paz is a 61 y.o. male.     Chief Complaint   Patient presents with   • Follow-up       History of Present Illness     Depression follow up, stable on meds  Hyperglycemia follow up . Last  fasting  Folic acid  deficiency follow up, labs today.    The following portions of the patient's history were reviewed and updated as appropriate: allergies, current medications, past family history, past medical history, past social history, past surgical history and problem list.    Past Medical History:   Diagnosis Date   • Abdominal pain, LLQ (left lower quadrant)    • Adverse reaction to drug    • Anxiety    • Bone pain    • Chest pain, midsternal    • Cough    • Current every day smoker    • Depression    • Encounter for smoking cessation counseling    • Hematuria    • High risk medication use    • Hyperglycemia    • Hyperlipidemia    • Irritability and anger    • Lymphoma (CMS/HCC)    • Proteinuria    • Sciatica    • Skin benign neoplasm    • Visual changes        Past Surgical History:   Procedure Laterality Date   • KNEE SURGERY Left    • KNEE SURGERY     • LYMPH NODE BIOPSY         Family History   Problem Relation Age of Onset   • Heart attack Father    • No Known Problems Other        Social History     Socioeconomic History   • Marital status:      Spouse name: Not on file   • Number of children: Not on file   • Years of education: Not on file   • Highest education level: Not on file   Tobacco Use   • Smoking status: Former Smoker     Quit date: 9/15/2020     Years since quittin.7   • Smokeless tobacco: Never Used   Substance and Sexual Activity   • Alcohol use: Not Currently     Comment: sober for 20 yrs   • Drug use: No   • Sexual activity: Yes       Current Outpatient Medications on File Prior to Visit   Medication Sig Dispense Refill   • buPROPion XL (Wellbutrin XL) 150 MG 24 hr tablet Take 1 tablet by mouth Daily. 30 tablet 11   • Co-Enzyme Q10 200 MG capsule Take  by mouth.      • [DISCONTINUED] folic acid (FOLVITE) 1 MG tablet Take 1 tablet by mouth Daily. 90 tablet 3     No current facility-administered medications on file prior to visit.       Review of Systems   Constitutional: Negative.    Neurological: Positive for headache.       Recent Results (from the past 4704 hour(s))   Hemoglobin A1c    Collection Time: 12/15/20  9:43 AM    Specimen: Blood   Result Value Ref Range    Hemoglobin A1C 5.60 4.80 - 5.60 %   Comprehensive Metabolic Panel    Collection Time: 12/15/20  9:43 AM    Specimen: Blood   Result Value Ref Range    Glucose 106 (H) 65 - 99 mg/dL    BUN 15 8 - 23 mg/dL    Creatinine 1.22 0.76 - 1.27 mg/dL    eGFR Non African Am 60 (L) >60 mL/min/1.73    eGFR African Am 73 >60 mL/min/1.73    BUN/Creatinine Ratio 12.3 7.0 - 25.0    Sodium 139 136 - 145 mmol/L    Potassium 4.8 3.5 - 5.2 mmol/L    Chloride 103 98 - 107 mmol/L    Total CO2 28.9 22.0 - 29.0 mmol/L    Calcium 9.4 8.6 - 10.5 mg/dL    Total Protein 6.7 6.0 - 8.5 g/dL    Albumin 4.50 3.50 - 5.20 g/dL    Globulin 2.2 gm/dL    A/G Ratio 2.0 g/dL    Total Bilirubin 0.6 0.0 - 1.2 mg/dL    Alkaline Phosphatase 139 (H) 39 - 117 U/L    AST (SGOT) 17 1 - 40 U/L    ALT (SGPT) 25 1 - 41 U/L   Lipid Panel With LDL / HDL Ratio    Collection Time: 12/15/20  9:43 AM    Specimen: Blood   Result Value Ref Range    Total Cholesterol 154 0 - 200 mg/dL    Triglycerides 89 0 - 150 mg/dL    HDL Cholesterol 51 40 - 60 mg/dL    VLDL Cholesterol Jone 17 5 - 40 mg/dL    LDL Chol Calc (NIH) 86 0 - 100 mg/dL    LDL/HDL RATIO 1.67    Vitamin B12 & Folate    Collection Time: 12/15/20  9:43 AM   Result Value Ref Range    Vitamin B-12 384 211 - 946 pg/mL    Folate 4.42 (L) 4.78 - 24.20 ng/mL   Vitamin D 25 Hydroxy    Collection Time: 12/15/20  9:43 AM   Result Value Ref Range    25 Hydroxy, Vitamin D 32.6 30.0 - 100.0 ng/ml   Doppler Ankle Brachial Index Single Level CAR    Collection Time: 01/08/21 10:57 AM   Result Value Ref Range    RIGHT DORSALIS  PEDIS SYS  mmHg    RIGHT POST TIBIAL SYS  mmHg    RIGHT GREAT TOE SYS  mmHg    RIGHT RICHARD RATIO 1.15     RIGHT TBI RATIO 1.04     LEFT DORSALIS PEDIS SYS  mmHg    LEFT POST TIBIAL SYS  mmHg    LEFT GREAT TOE SYS  mmHg    LEFT RICHARD RATIO 1.12     LEFT TBI RATIO 1.02     Upper arterial right arm brachial sys max 126 mmHg    Upper arterial left arm brachial sys max 131 mmHg   LACTATE DEHYDROGENASE    Collection Time: 02/12/21 10:36 AM    Specimen: Fresh Frozen Plasma    Specimen type and source: Plasma, Blood   Result Value Ref Range     313 - 618 U/L   COMPREHENSIVE METABOLIC PANEL    Collection Time: 02/12/21 10:36 AM    Specimen: Fresh Frozen Plasma    Specimen type and source: Plasma, Blood   Result Value Ref Range    Sodium 142 137 - 145 mmol/L    Potassium 4.3 3.5 - 5.1 mmol/L    Chloride 106 98 - 107 mmol/L    Total CO2 26 22 - 30 mmol/L    Glucose 119 (H) 74 - 99 mg/dL    BUN 12 7 - 20 mg/dL    Creatinine 1.10 0.7 - 1.5 mg/dL    BUN/Creatinine Ratio 10.9 RATIO    Est GFR by Clearance >60 >60 /1.73 m2    Total Protein 7.3 6.3 - 8.2 g/dL    Albumin 4.4 3.5 - 5.0 g/dL    Globulin 2.9 1.5 - 4.5 g/dL    A/G Ratio 1.5 1.1 - 2.5 RATIO    Calcium 9.2 8.4 - 10.2 mg/dL    Total Bilirubin 0.6 0.2 - 1.3 mg/dL    AST (SGOT) 24 15 - 46 U/L    ALT (SGPT) 22 0 - 50 U/L    Alkaline Phosphatase 116 38 - 126 U/L   CBC AND DIFFERENTIAL    Collection Time: 02/12/21 10:36 AM    Specimen type and source: Whole Blood, Blood   Result Value Ref Range    WBC 4.66 4.5 - 11.0 10*3/uL    RBC 4.91 4.5 - 5.9 10*6/uL    Hemoglobin 14.7 13.5 - 17.5 g/dL    Hematocrit 43.6 41.0 - 53.0 %    MCV 88.8 80.0 - 100.0 fL    MCH 29.9 26.0 - 34.0 pg    MCHC 33.7 31.0 - 37.0 g/dL    RDW 12.9 12.0 - 16.8 %    Platelets 171 140 - 440 10*3/uL    MPV 9.4 8.4 - 12.4 fL    Neutrophil Rel % 65.9 45 - 80 %    Lymphocyte Rel % 23.8 15 - 50 %    Monocyte Rel % 5.8 0 - 15 %    Eosinophil % 3.6 0 - 7 %    Basophil Rel %  "0.9 0 - 2 %    Neutrophils Absolute 3.07 2.0 - 8.8 10*3/uL    Lymphocytes Absolute 1.11 0.7 - 5.5 10*3/uL    Monocytes Absolute 0.27 0.0 - 1.7 10*3/uL    Eosinophils Absolute 0.17 0.0 - 0.8 10*3/uL    Basophils Absolute 0.04 0.0 - 0.2 10*3/uL     Objective   Vitals:    06/10/21 0910   BP: 119/75   Pulse: 65   Temp: 97.7 °F (36.5 °C)   SpO2: 98%   Weight: 81.2 kg (179 lb)   Height: 177.8 cm (70\")     Body mass index is 25.68 kg/m².  Physical Exam  Vitals and nursing note reviewed.   Constitutional:       General: He is not in acute distress.     Appearance: He is well-developed. He is not diaphoretic.   Cardiovascular:      Rate and Rhythm: Normal rate and regular rhythm.   Pulmonary:      Effort: Pulmonary effort is normal. No respiratory distress.      Breath sounds: Normal breath sounds. No wheezing.           Diagnoses and all orders for this visit:    1. Severe episode of recurrent major depressive disorder, without psychotic features (CMS/[facility]) (Primary)  Comments:  improved, stable on meds  continue current meds    2. Hyperglycemia  -     Hemoglobin A1c    3. Folic acid deficiency  -     Vitamin B12 & Folate    4. Mixed hyperlipidemia  -     Lipid Panel With LDL / HDL Ratio    5. Anxiety        Return in about 9 months (around 3/14/2022) for Medicare Wellness.        "

## 2021-06-11 LAB
CHOLEST SERPL-MCNC: 227 MG/DL (ref 0–200)
FOLATE SERPL-MCNC: 14.5 NG/ML (ref 4.78–24.2)
HBA1C MFR BLD: 5.9 % (ref 4.8–5.6)
HDLC SERPL-MCNC: 48 MG/DL (ref 40–60)
LDLC SERPL CALC-MCNC: 158 MG/DL (ref 0–100)
LDLC/HDLC SERPL: 3.23 {RATIO}
TRIGL SERPL-MCNC: 119 MG/DL (ref 0–150)
VIT B12 SERPL-MCNC: 532 PG/ML (ref 211–946)
VLDLC SERPL CALC-MCNC: 21 MG/DL (ref 5–40)

## 2022-03-25 ENCOUNTER — OFFICE VISIT (OUTPATIENT)
Dept: FAMILY MEDICINE CLINIC | Facility: CLINIC | Age: 62
End: 2022-03-25

## 2022-03-25 VITALS
TEMPERATURE: 97.3 F | DIASTOLIC BLOOD PRESSURE: 86 MMHG | WEIGHT: 190 LBS | HEIGHT: 70 IN | HEART RATE: 66 BPM | BODY MASS INDEX: 27.2 KG/M2 | OXYGEN SATURATION: 96 % | SYSTOLIC BLOOD PRESSURE: 122 MMHG

## 2022-03-25 DIAGNOSIS — F33.2 SEVERE EPISODE OF RECURRENT MAJOR DEPRESSIVE DISORDER, WITHOUT PSYCHOTIC FEATURES: ICD-10-CM

## 2022-03-25 DIAGNOSIS — Z85.72 HISTORY OF NON-HODGKIN'S LYMPHOMA: ICD-10-CM

## 2022-03-25 DIAGNOSIS — Z00.00 MEDICARE ANNUAL WELLNESS VISIT, SUBSEQUENT: Primary | ICD-10-CM

## 2022-03-25 DIAGNOSIS — M25.511 ACUTE PAIN OF RIGHT SHOULDER: ICD-10-CM

## 2022-03-25 DIAGNOSIS — E78.00 PURE HYPERCHOLESTEROLEMIA: ICD-10-CM

## 2022-03-25 DIAGNOSIS — R73.9 HYPERGLYCEMIA: ICD-10-CM

## 2022-03-25 PROCEDURE — 99214 OFFICE O/P EST MOD 30 MIN: CPT | Performed by: FAMILY MEDICINE

## 2022-03-25 PROCEDURE — G0439 PPPS, SUBSEQ VISIT: HCPCS | Performed by: FAMILY MEDICINE

## 2022-03-25 PROCEDURE — 1170F FXNL STATUS ASSESSED: CPT | Performed by: FAMILY MEDICINE

## 2022-03-25 PROCEDURE — 73060 X-RAY EXAM OF HUMERUS: CPT | Performed by: FAMILY MEDICINE

## 2022-03-25 PROCEDURE — 1160F RVW MEDS BY RX/DR IN RCRD: CPT | Performed by: FAMILY MEDICINE

## 2022-03-25 PROCEDURE — 73030 X-RAY EXAM OF SHOULDER: CPT | Performed by: FAMILY MEDICINE

## 2022-03-25 RX ORDER — BUPROPION HYDROCHLORIDE 150 MG/1
150 TABLET ORAL DAILY
Qty: 30 TABLET | Refills: 11 | Status: ON HOLD | OUTPATIENT
Start: 2022-03-25 | End: 2022-09-11

## 2022-03-25 NOTE — PROGRESS NOTES
The ABCs of the Annual Wellness Visit  Subsequent Medicare Wellness Visit    Chief Complaint   Patient presents with   • Medicare Wellness-subsequent      Subjective    History of Present Illness:  Srinivasa Paz is a 61 y.o. male who presents for a Subsequent Medicare Wellness Visit.  Patient is also following up on his hyperlipidemia today.  Labs due.  Depression is uncontrolled.  However he stopped his Wellbutrin on his own.  We will restarted today.  History of non-Hodgkin's lymphoma now in remission.  However patient has a new complaint of quite significant right shoulder and right upper arm pain x-rays today we will consider an MRI.    The following portions of the patient's history were reviewed and   updated as appropriate: allergies, current medications, past family history, past medical history, past social history, past surgical history and problem list.    Compared to one year ago, the patient feels his physical   health is worse.    Compared to one year ago, the patient feels his mental   health is worse.    Recent Hospitalizations:  He was not admitted to the hospital during the last year.       Current Medical Providers:  Patient Care Team:  Nita Gallegos MD as PCP - General (Family Medicine)    Outpatient Medications Prior to Visit   Medication Sig Dispense Refill   • Co-Enzyme Q10 200 MG capsule Take  by mouth.     • buPROPion XL (Wellbutrin XL) 150 MG 24 hr tablet Take 1 tablet by mouth Daily. 30 tablet 11     No facility-administered medications prior to visit.       No opioid medication identified on active medication list. I have reviewed chart for other potential  high risk medication/s and harmful drug interactions in the elderly.          Aspirin is not on active medication list.  Aspirin use is not indicated based on review of current medical condition/s. Risk of harm outweighs potential benefits.  .    Patient Active Problem List   Diagnosis   • Medicare annual wellness visit, subsequent  "  • Hyperlipidemia   • Depression   • Anxiety   • Disorder of shoulder joint   • Encounter for monitoring cardiotoxic drug therapy   • Follicular lymphoma (HCC)   • History of non-Hodgkin's lymphoma   • Hx antineoplastic chemotherapy   • Personal history of radiation exposure   • Pure hypercholesterolemia   • Tobacco use disorder   • Hyperglycemia   • Folic acid deficiency   • Acute pain of right shoulder     Advance Care Planning  Advance Directive is not on file.  ACP discussion was held with the patient during this visit. Patient has an advance directive (not in EMR), copy requested. Patient does not have an advance directive, information provided.    Review of Systems   Constitutional: Negative.         Objective    Vitals:    22 1102   BP: 122/86   Pulse: 66   Temp: 97.3 °F (36.3 °C)   SpO2: 96%   Weight: 86.2 kg (190 lb)   Height: 177.8 cm (70\")     BMI Readings from Last 1 Encounters:   22 27.26 kg/m²   BMI is above normal parameters. Recommendations include: nutrition counseling    Does the patient have evidence of cognitive impairment? No    Physical Exam  Vitals and nursing note reviewed.   Constitutional:       Appearance: Normal appearance.   Musculoskeletal:      Comments: Negative impingement sign on the right shoulder however she does have tenderness over the shoulder on palpation as well as the upper humerus   Neurological:      Mental Status: He is alert.                 HEALTH RISK ASSESSMENT    Smoking Status:  Social History     Tobacco Use   Smoking Status Former Smoker   • Quit date: 9/15/2020   • Years since quittin.5   Smokeless Tobacco Never Used     Alcohol Consumption:  Social History     Substance and Sexual Activity   Alcohol Use Not Currently    Comment: sober for 20 yrs     Fall Risk Screen:    LEONARDOADI Fall Risk Assessment was completed, and patient is at MODERATE risk for falls. Assessment completed on:3/25/2022    Depression Screening:  PHQ-2/PHQ-9 Depression Screening " 3/25/2022   Retired Total Score -   Little Interest or Pleasure in Doing Things 3-->nearly every day   Feeling Down, Depressed or Hopeless 3-->nearly every day   Trouble Falling or Staying Asleep, or Sleeping Too Much 3-->nearly every day   Feeling Tired or Having Little Energy 0-->not at all   Poor Appetite or Overeating 0-->not at all   Feeling Bad about Yourself - or that You are a Failure or Have Let Yourself or Your Family Down 3-->nearly every day   Trouble Concentrating on Things, Such as Reading the Newspaper or Watching Television 0-->not at all   Thoughts that You Would be Better Off Dead or of Hurting Yourself in Some Way 0-->not at all   PHQ-9: Brief Depression Severity Measure Score 12       Health Habits and Functional and Cognitive Screening:  Functional & Cognitive Status 3/25/2022   Do you have difficulty preparing food and eating? No   Do you have difficulty bathing yourself, getting dressed or grooming yourself? No   Do you have difficulty using the toilet? No   Do you have difficulty moving around from place to place? No   Do you have trouble with steps or getting out of a bed or a chair? No   Current Diet Well Balanced Diet   Dental Exam Up to date   Eye Exam Not up to date   Exercise (times per week) 2 times per week   Current Exercises Include Walking   Current Exercise Activities Include -   Do you need help using the phone?  No   Are you deaf or do you have serious difficulty hearing?  Yes   Do you need help with transportation? No   Do you need help shopping? No   Do you need help preparing meals?  No   Do you need help with housework?  No   Do you need help with laundry? No   Do you need help taking your medications? No   Do you need help managing money? No   Do you ever drive or ride in a car without wearing a seat belt? No   Have you felt unusual stress, anger or loneliness in the last month? Yes   Who do you live with? Spouse   If you need help, do you have trouble finding someone  available to you? No   Have you been bothered in the last four weeks by sexual problems? -   Do you have difficulty concentrating, remembering or making decisions? No       Age-appropriate Screening Schedule:  Refer to the list below for future screening recommendations based on patient's age, sex and/or medical conditions. Orders for these recommended tests are listed in the plan section. The patient has been provided with a written plan.    Health Maintenance   Topic Date Due   • ZOSTER VACCINE (2 of 2) 11/28/2019   • LIPID PANEL  06/10/2022   • TDAP/TD VACCINES (2 - Td or Tdap) 05/21/2029   • INFLUENZA VACCINE  Completed              Assessment/Plan   CMS Preventative Services Quick Reference  Risk Factors Identified During Encounter  Fall Risk-High or Moderate  The above risks/problems have been discussed with the patient.  Follow up actions/plans if indicated are seen below in the Assessment/Plan Section.  Pertinent information has been shared with the patient in the After Visit Summary.    Diagnoses and all orders for this visit:    1. Medicare annual wellness visit, subsequent (Primary)    2. Severe episode of recurrent major depressive disorder, without psychotic features (HCC)  -     buPROPion XL (Wellbutrin XL) 150 MG 24 hr tablet; Take 1 tablet by mouth Daily.  Dispense: 30 tablet; Refill: 11    3. Hyperglycemia  -     Lipid Panel With LDL / HDL Ratio  -     Hemoglobin A1c    4. Pure hypercholesterolemia  -     Lipid Panel With LDL / HDL Ratio    5. Acute pain of right shoulder  -     XR Shoulder 2+ View Right (In Office)  -     XR Humerus Right (In Office)    6. History of non-Hodgkin's lymphoma  -     XR Shoulder 2+ View Right (In Office)  -     XR Humerus Right (In Office)        Follow Up:    Return in about 6 months (around 9/25/2022) for HYPERLIPIDEMIA.      An After Visit Summary and PPPS were made available to the patient.

## 2022-03-26 LAB
CHOLEST SERPL-MCNC: 228 MG/DL (ref 100–199)
HBA1C MFR BLD: 5.8 % (ref 4.8–5.6)
HDLC SERPL-MCNC: 48 MG/DL
LDLC SERPL CALC-MCNC: 159 MG/DL (ref 0–99)
LDLC/HDLC SERPL: 3.3 RATIO (ref 0–3.6)
TRIGL SERPL-MCNC: 115 MG/DL (ref 0–149)
VLDLC SERPL CALC-MCNC: 21 MG/DL (ref 5–40)

## 2022-09-11 ENCOUNTER — HOSPITAL ENCOUNTER (INPATIENT)
Facility: HOSPITAL | Age: 62
LOS: 1 days | Discharge: HOME | End: 2022-09-12
Attending: EMERGENCY MEDICINE | Admitting: STUDENT IN AN ORGANIZED HEALTH CARE EDUCATION/TRAINING PROGRAM

## 2022-09-11 ENCOUNTER — APPOINTMENT (OUTPATIENT)
Dept: CT IMAGING | Facility: HOSPITAL | Age: 62
End: 2022-09-11

## 2022-09-11 ENCOUNTER — APPOINTMENT (OUTPATIENT)
Dept: GENERAL RADIOLOGY | Facility: HOSPITAL | Age: 62
End: 2022-09-11

## 2022-09-11 DIAGNOSIS — K02.9 DENTAL CARIES: ICD-10-CM

## 2022-09-11 DIAGNOSIS — Z85.72 HISTORY OF LYMPHOMA: ICD-10-CM

## 2022-09-11 DIAGNOSIS — R22.1 NECK SWELLING: ICD-10-CM

## 2022-09-11 DIAGNOSIS — R59.0 CERVICAL LYMPHADENOPATHY: Primary | ICD-10-CM

## 2022-09-11 PROBLEM — R91.8 ABNORMAL FINDINGS ON DIAGNOSTIC IMAGING OF LUNG: Status: ACTIVE | Noted: 2022-09-11

## 2022-09-11 LAB
ALBUMIN SERPL-MCNC: 4.4 G/DL (ref 3.5–5.2)
ALBUMIN/GLOB SERPL: 1.8 G/DL
ALP SERPL-CCNC: 127 U/L (ref 39–117)
ALT SERPL W P-5'-P-CCNC: 35 U/L (ref 1–41)
ANION GAP SERPL CALCULATED.3IONS-SCNC: 7.4 MMOL/L (ref 5–15)
AST SERPL-CCNC: 25 U/L (ref 1–40)
BASOPHILS # BLD AUTO: 0.06 10*3/MM3 (ref 0–0.2)
BASOPHILS NFR BLD AUTO: 1.1 % (ref 0–1.5)
BILIRUB SERPL-MCNC: 0.5 MG/DL (ref 0–1.2)
BUN SERPL-MCNC: 11 MG/DL (ref 8–23)
BUN/CREAT SERPL: 10.2 (ref 7–25)
CALCIUM SPEC-SCNC: 9.4 MG/DL (ref 8.6–10.5)
CHLORIDE SERPL-SCNC: 102 MMOL/L (ref 98–107)
CO2 SERPL-SCNC: 27.6 MMOL/L (ref 22–29)
CREAT SERPL-MCNC: 1.08 MG/DL (ref 0.76–1.27)
D-LACTATE SERPL-SCNC: 1.1 MMOL/L (ref 0.5–2)
DEPRECATED RDW RBC AUTO: 40.7 FL (ref 37–54)
EGFRCR SERPLBLD CKD-EPI 2021: 77.1 ML/MIN/1.73
EOSINOPHIL # BLD AUTO: 0.13 10*3/MM3 (ref 0–0.4)
EOSINOPHIL NFR BLD AUTO: 2.3 % (ref 0.3–6.2)
ERYTHROCYTE [DISTWIDTH] IN BLOOD BY AUTOMATED COUNT: 12.9 % (ref 12.3–15.4)
GLOBULIN UR ELPH-MCNC: 2.4 GM/DL
GLUCOSE SERPL-MCNC: 96 MG/DL (ref 65–99)
HCT VFR BLD AUTO: 43.6 % (ref 37.5–51)
HGB BLD-MCNC: 15 G/DL (ref 13–17.7)
IMM GRANULOCYTES # BLD AUTO: 0.01 10*3/MM3 (ref 0–0.05)
IMM GRANULOCYTES NFR BLD AUTO: 0.2 % (ref 0–0.5)
LYMPHOCYTES # BLD AUTO: 0.95 10*3/MM3 (ref 0.7–3.1)
LYMPHOCYTES NFR BLD AUTO: 17.1 % (ref 19.6–45.3)
MCH RBC QN AUTO: 29.6 PG (ref 26.6–33)
MCHC RBC AUTO-ENTMCNC: 34.4 G/DL (ref 31.5–35.7)
MCV RBC AUTO: 86.2 FL (ref 79–97)
MONOCYTES # BLD AUTO: 0.34 10*3/MM3 (ref 0.1–0.9)
MONOCYTES NFR BLD AUTO: 6.1 % (ref 5–12)
NEUTROPHILS NFR BLD AUTO: 4.08 10*3/MM3 (ref 1.7–7)
NEUTROPHILS NFR BLD AUTO: 73.2 % (ref 42.7–76)
NRBC BLD AUTO-RTO: 0 /100 WBC (ref 0–0.2)
PLATELET # BLD AUTO: 163 10*3/MM3 (ref 140–450)
PMV BLD AUTO: 9.5 FL (ref 6–12)
POTASSIUM SERPL-SCNC: 4.6 MMOL/L (ref 3.5–5.2)
PROCALCITONIN SERPL-MCNC: 0.07 NG/ML (ref 0–0.25)
PROT SERPL-MCNC: 6.8 G/DL (ref 6–8.5)
RBC # BLD AUTO: 5.06 10*6/MM3 (ref 4.14–5.8)
SODIUM SERPL-SCNC: 137 MMOL/L (ref 136–145)
WBC NRBC COR # BLD: 5.57 10*3/MM3 (ref 3.4–10.8)

## 2022-09-11 PROCEDURE — G0378 HOSPITAL OBSERVATION PER HR: HCPCS

## 2022-09-11 PROCEDURE — 25010000002 IOPAMIDOL 61 % SOLUTION: Performed by: EMERGENCY MEDICINE

## 2022-09-11 PROCEDURE — 70491 CT SOFT TISSUE NECK W/DYE: CPT

## 2022-09-11 PROCEDURE — 96365 THER/PROPH/DIAG IV INF INIT: CPT

## 2022-09-11 PROCEDURE — 25010000002 AMPICILLIN-SULBACTAM PER 1.5 G: Performed by: EMERGENCY MEDICINE

## 2022-09-11 PROCEDURE — 71260 CT THORAX DX C+: CPT

## 2022-09-11 PROCEDURE — 25010000002 AMPICILLIN-SULBACTAM PER 1.5 G: Performed by: INTERNAL MEDICINE

## 2022-09-11 PROCEDURE — 83605 ASSAY OF LACTIC ACID: CPT | Performed by: EMERGENCY MEDICINE

## 2022-09-11 PROCEDURE — 99284 EMERGENCY DEPT VISIT MOD MDM: CPT

## 2022-09-11 PROCEDURE — 85025 COMPLETE CBC W/AUTO DIFF WBC: CPT | Performed by: EMERGENCY MEDICINE

## 2022-09-11 PROCEDURE — 84145 PROCALCITONIN (PCT): CPT | Performed by: EMERGENCY MEDICINE

## 2022-09-11 PROCEDURE — 71046 X-RAY EXAM CHEST 2 VIEWS: CPT

## 2022-09-11 PROCEDURE — 80053 COMPREHEN METABOLIC PANEL: CPT | Performed by: EMERGENCY MEDICINE

## 2022-09-11 RX ORDER — SODIUM CHLORIDE 0.9 % (FLUSH) 0.9 %
10 SYRINGE (ML) INJECTION EVERY 12 HOURS SCHEDULED
Status: DISCONTINUED | OUTPATIENT
Start: 2022-09-11 | End: 2022-09-12 | Stop reason: HOSPADM

## 2022-09-11 RX ORDER — SODIUM CHLORIDE 0.9 % (FLUSH) 0.9 %
10 SYRINGE (ML) INJECTION AS NEEDED
Status: DISCONTINUED | OUTPATIENT
Start: 2022-09-11 | End: 2022-09-12 | Stop reason: HOSPADM

## 2022-09-11 RX ORDER — ACETAMINOPHEN 650 MG/1
650 SUPPOSITORY RECTAL EVERY 4 HOURS PRN
Status: DISCONTINUED | OUTPATIENT
Start: 2022-09-11 | End: 2022-09-12 | Stop reason: HOSPADM

## 2022-09-11 RX ORDER — OXYCODONE HYDROCHLORIDE AND ACETAMINOPHEN 5; 325 MG/1; MG/1
1 TABLET ORAL EVERY 4 HOURS PRN
Status: DISCONTINUED | OUTPATIENT
Start: 2022-09-11 | End: 2022-09-12 | Stop reason: HOSPADM

## 2022-09-11 RX ORDER — ACETAMINOPHEN 325 MG/1
650 TABLET ORAL EVERY 4 HOURS PRN
Status: DISCONTINUED | OUTPATIENT
Start: 2022-09-11 | End: 2022-09-12 | Stop reason: HOSPADM

## 2022-09-11 RX ORDER — ONDANSETRON 2 MG/ML
4 INJECTION INTRAMUSCULAR; INTRAVENOUS EVERY 6 HOURS PRN
Status: DISCONTINUED | OUTPATIENT
Start: 2022-09-11 | End: 2022-09-12 | Stop reason: HOSPADM

## 2022-09-11 RX ORDER — ACETAMINOPHEN 160 MG/5ML
650 SOLUTION ORAL EVERY 4 HOURS PRN
Status: DISCONTINUED | OUTPATIENT
Start: 2022-09-11 | End: 2022-09-12 | Stop reason: HOSPADM

## 2022-09-11 RX ORDER — ONDANSETRON 4 MG/1
4 TABLET, FILM COATED ORAL EVERY 6 HOURS PRN
Status: DISCONTINUED | OUTPATIENT
Start: 2022-09-11 | End: 2022-09-12 | Stop reason: HOSPADM

## 2022-09-11 RX ORDER — MULTIVITAMIN WITH IRON
TABLET ORAL NIGHTLY
COMMUNITY

## 2022-09-11 RX ADMIN — Medication 10 ML: at 20:23

## 2022-09-11 RX ADMIN — IOPAMIDOL 85 ML: 612 INJECTION, SOLUTION INTRAVENOUS at 13:34

## 2022-09-11 RX ADMIN — ACETAMINOPHEN 650 MG: 325 TABLET, FILM COATED ORAL at 20:22

## 2022-09-11 RX ADMIN — AMPICILLIN SODIUM AND SULBACTAM SODIUM 3 G: 2; 1 INJECTION, POWDER, FOR SOLUTION INTRAMUSCULAR; INTRAVENOUS at 14:07

## 2022-09-11 RX ADMIN — AMPICILLIN SODIUM AND SULBACTAM SODIUM 3 G: 2; 1 INJECTION, POWDER, FOR SOLUTION INTRAMUSCULAR; INTRAVENOUS at 17:15

## 2022-09-11 NOTE — ED PROVIDER NOTES
Subjective   PIT    62-year-old male with past medical history of lymphoma, hyperglycemia, hyperlipidemia here for chief complaint of left-sided neck swelling.  History was obtained from the patient.  The patient states that this started approximately 2 days ago.  He states that he has not had any pain in his tooth.  He states that today he woke up and was hurting more therefore he decided come in.  He states that he is noticed some swelling.  He denies any shortness of breath.  He states that he does have some mild pain when he swallows.  Patient denies any chest pain, shortness of breath, abdominal pain, nausea, vomiting, diarrhea, fevers, chills, rashes, night sweats, recent weight loss.          Review of Systems   All other systems reviewed and are negative.      Past Medical History:   Diagnosis Date   • Abdominal pain, LLQ (left lower quadrant)    • Adverse reaction to drug    • Anxiety    • Bone pain    • Chest pain, midsternal    • Cough    • Current every day smoker    • Depression    • Encounter for smoking cessation counseling    • Hematuria    • High risk medication use    • Hyperglycemia    • Hyperlipidemia    • Irritability and anger    • Lymphoma (HCC)    • Proteinuria    • Sciatica    • Skin benign neoplasm    • Visual changes        Allergies   Allergen Reactions   • Codeine Unknown (See Comments) and Nausea And Vomiting     UNKNOWN   • Fentanyl GI Intolerance       Past Surgical History:   Procedure Laterality Date   • KNEE SURGERY Left    • KNEE SURGERY     • LYMPH NODE BIOPSY         Family History   Problem Relation Age of Onset   • Heart attack Father    • No Known Problems Other        Social History     Socioeconomic History   • Marital status:    Tobacco Use   • Smoking status: Former Smoker     Quit date: 9/15/2020     Years since quittin.9   • Smokeless tobacco: Never Used   Substance and Sexual Activity   • Alcohol use: Not Currently     Comment: sober for 20 yrs   • Drug use:  No   • Sexual activity: Yes           Objective   Physical Exam  Constitutional:       General: He is not in acute distress.     Appearance: He is not ill-appearing, toxic-appearing or diaphoretic.   HENT:      Head: Normocephalic and atraumatic.      Right Ear: External ear normal.      Left Ear: External ear normal.      Nose: Nose normal. No congestion or rhinorrhea.      Mouth/Throat:      Mouth: Mucous membranes are moist.      Pharynx: Oropharynx is clear. No oropharyngeal exudate or posterior oropharyngeal erythema.      Comments: Patient has swelling noted to the left side of the neck, patient can open his mouth without any issues, no pain to palpation of the tongue  Eyes:      General: No scleral icterus.        Right eye: No discharge.         Left eye: No discharge.      Extraocular Movements: Extraocular movements intact.      Conjunctiva/sclera: Conjunctivae normal.      Pupils: Pupils are equal, round, and reactive to light.   Neck:      Comments: No swelling noted to the right side of the neck  Cardiovascular:      Rate and Rhythm: Normal rate and regular rhythm.      Pulses: Normal pulses.      Heart sounds: Normal heart sounds. No murmur heard.    No friction rub. No gallop.   Pulmonary:      Effort: Pulmonary effort is normal. No respiratory distress.      Breath sounds: Normal breath sounds. No stridor. No wheezing, rhonchi or rales.   Chest:      Chest wall: No tenderness.   Abdominal:      General: Abdomen is flat. There is no distension.      Palpations: Abdomen is soft.      Tenderness: There is no abdominal tenderness. There is no right CVA tenderness, left CVA tenderness, guarding or rebound.   Musculoskeletal:         General: No swelling, tenderness, deformity or signs of injury. Normal range of motion.      Cervical back: Normal range of motion and neck supple. No rigidity.      Right lower leg: No edema.      Left lower leg: No edema.   Skin:     General: Skin is warm and dry.       Capillary Refill: Capillary refill takes less than 2 seconds.      Coloration: Skin is not jaundiced or pale.      Findings: No bruising, erythema, lesion or rash.   Neurological:      Mental Status: He is alert and oriented to person, place, and time.      Sensory: No sensory deficit.      Motor: No weakness.   Psychiatric:         Mood and Affect: Mood normal.         Behavior: Behavior normal.         Procedures           ED Course                                           MDM  Number of Diagnoses or Management Options     Amount and/or Complexity of Data Reviewed  Clinical lab tests: ordered and reviewed  Tests in the radiology section of CPT®: ordered and reviewed    Risk of Complications, Morbidity, and/or Mortality  General comments: When I first saw the patient, the patient appeared nontoxic.  Patient did have cervical lymphadenopathy on the left side of his neck.  I was concerned that this could be infection secondary to his tooth that had dental caries versus a brachial Cyst versus his recurrence of his previous lymphoma.  Given this, IV was started and labs were obtained.  Labs are grossly unremarkable.  I did order a chest x-ray that was read as possible scarring in the upper lobes versus a mass.  Therefore I did order a CT of the chest.  The CT neck with contrast was read as possible infection versus neoplasm could not be excluded.  This was done by Dr. Coleman.  The CT Chest showed scarring vs pulm nodule/neoplasm.  The pt is stable from an airway standpoint in the ed. patient was given 1 dose of IV Unasyn in case this was an infection in the ED I spoke to Dr. Grady with [facility] who has accepted the pt.  I defer all further management of the patient to the care of [facility].         Final diagnoses:   Cervical lymphadenopathy   Neck swelling   History of lymphoma   Dental caries       ED Disposition  ED Disposition     ED Disposition   Decision to Admit    Condition   --    Comment   Level of Care: Med/Surg  [1]   Diagnosis: Cervical lymphadenopathy [[id number]]   Admitting Physician: GIULIA CRENSHAW [[id number]]   Attending Physician: GIULIA CRENSHAW [[id number]]   Certification: I Certify That Inpatient Hospital Services Are Medically Necessary For Greater Than 2 Midnights               No follow-up provider specified.       Medication List      No changes were made to your prescriptions during this visit.          Ld Hoskins MD  09/11/22 1441       Ld Hoskins MD  09/11/22 1448

## 2022-09-11 NOTE — ED NOTES
Nursing report ED to floor  Srinivasa Paz  62 y.o.  male    HPI :   Chief Complaint   Patient presents with   • Neck Swelling       Admitting doctor:   Gino Grady MD    Admitting diagnosis:   The primary encounter diagnosis was Cervical lymphadenopathy. Diagnoses of Neck swelling, History of lymphoma, and Dental caries were also pertinent to this visit.    Code status:   Current Code Status     Date Active Code Status Order ID Comments User Context       Not on file    Advance Care Planning Activity          Allergies:   Codeine and Fentanyl    Intake and Output    Intake/Output Summary (Last 24 hours) at 9/11/2022 1446  Last data filed at 9/11/2022 1445  Gross per 24 hour   Intake 100 ml   Output --   Net 100 ml       Weight:       09/11/22  1134   Weight: 83.9 kg (185 lb)       Most recent vitals:   Vitals:    09/11/22 1231 09/11/22 1301 09/11/22 1401 09/11/22 1431   BP: 122/75 124/71 131/76 130/86   Pulse: (!) 45 50 (!) 47 50   Resp:  16 16 16   Temp:       SpO2: 99% 99% 100% 100%   Weight:       Height:           Active LDAs/IV Access:   Lines, Drains & Airways     Active LDAs     Name Placement date Placement time Site Days    Peripheral IV 09/11/22 1147 Right Antecubital 09/11/22  1147  Antecubital  less than 1                Labs (abnormal labs have a star):   Labs Reviewed   COMPREHENSIVE METABOLIC PANEL - Abnormal; Notable for the following components:       Result Value    Alkaline Phosphatase 127 (*)     All other components within normal limits    Narrative:     GFR Normal >60  Chronic Kidney Disease <60  Kidney Failure <15     CBC WITH AUTO DIFFERENTIAL - Abnormal; Notable for the following components:    Lymphocyte % 17.1 (*)     All other components within normal limits   LACTIC ACID, PLASMA - Normal   PROCALCITONIN - Normal    Narrative:     As a Marker for Sepsis (Non-Neonates):    1. <0.5 ng/mL represents a low risk of severe sepsis and/or septic shock.  2. >2 ng/mL represents a high risk of  "severe sepsis and/or septic shock.    As a Marker for Lower Respiratory Tract Infections that require antibiotic therapy:    PCT on Admission    Antibiotic Therapy       6-12 Hrs later    >0.5                Strongly Recommended  >0.25 - <0.5        Recommended   0.1 - 0.25          Discouraged              Remeasure/reassess PCT  <0.1                Strongly Discouraged     Remeasure/reassess PCT    As 28 day mortality risk marker: \"Change in Procalcitonin Result\" (>80% or <=80%) if Day 0 (or Day 1) and Day 4 values are available. Refer to http://[email or web address]-pct-calculator.com    Change in PCT <=80%  A decrease of PCT levels below or equal to 80% defines a positive change in PCT test result representing a higher risk for 28-day all-cause mortality of patients diagnosed with severe sepsis for septic shock.    Change in PCT >80%  A decrease of PCT levels of more than 80% defines a negative change in PCT result representing a lower risk for 28-day all-cause mortality of patients diagnosed with severe sepsis or septic shock.      CBC AND DIFFERENTIAL    Narrative:     The following orders were created for panel order CBC & Differential.  Procedure                               Abnormality         Status                     ---------                               -----------         ------                     CBC Auto Differential[[id number]]        Abnormal            Final result                 Please view results for these tests on the individual orders.       EKG:   No orders to display       Meds given in ED:   Medications   sodium chloride 0.9 % flush 10 mL (has no administration in time range)   iopamidol (ISOVUE-300) 61 % injection 100 mL (85 mL Intravenous Given 9/11/22 1334)   ampicillin-sulbactam (UNASYN) 3 g in sodium chloride 0.9 % 100 mL IVPB-VTB (0 g Intravenous Stopped 9/11/22 1445)       Imaging results:  XR Chest 2 View    Result Date: 9/11/2022   There is increased density within the right lung apex " which may simply represent pleural and parenchymal scarring. However, I cannot exclude the possibility of pneumonia or a mass at this site. I recommend further evaluation with a CT scan of the chest.  These findings and recommendations were discussed with Dr. Hoskins on 2022 at approximately 12:09 PM.  This report was finalized on 2022 12:18 PM by Dr. Joseph Coleman M.D.      CT Chest With Contrast Diagnostic    Result Date: 2022   Right apical pleural parenchymal changes suspected to represent nodular scarring, possibility of pulmonary nodule/neoplasm not excluded, further evaluation/follow-up recommended as indicated.  This report was finalized on 2022 2:06 PM by Dr. Dank Simmons M.D.        Ambulatory status:   - Independent    Social issues:   Social History     Socioeconomic History   • Marital status:    Tobacco Use   • Smoking status: Former Smoker     Quit date: 9/15/2020     Years since quittin.9   • Smokeless tobacco: Never Used   Substance and Sexual Activity   • Alcohol use: Not Currently     Comment: sober for 20 yrs   • Drug use: No   • Sexual activity: Yes       NIH Stroke Scale:        Nursing report ED to floor:

## 2022-09-11 NOTE — H&P
Patient Name:  Srinivasa Paz  YOB: 1960  MRN:  [id number]  Admit Date:  9/11/2022  Patient Care Team:  Nita Gallegos MD as PCP - General (Family Medicine)      Subjective   History Present Illness     Chief Complaint   Patient presents with   • Neck Swelling       Mr. Paz is a 62 y.o. with a history of lymphoma seen by Dr. Rambo Muñiz with [facility], not currently on any treatment.  He presents with 2-day history of increased swelling of his left neck.  It has progressed and became more painful today.  He denies any fever or chills.  He went to see outpatient provider today and then was referred to the emergency room.  In the ER he had CT scan as well as was given IV antibiotics.  Of note he has had some poor dentition and does have dental caries on his left lower teeth.    History of Present Illness  Review of Systems   Constitutional: Negative.    HENT: Positive for dental problem.         +neck swelling   Eyes: Negative.    Respiratory: Negative.    Cardiovascular: Negative.    Gastrointestinal: Negative.    Endocrine: Negative.    Genitourinary: Negative.    Musculoskeletal: Negative.    Skin: Negative.    Allergic/Immunologic: Negative.    Neurological: Negative.    Hematological: Negative.    Psychiatric/Behavioral: Negative.         Personal History     Past Medical History:   Diagnosis Date   • Abdominal pain, LLQ (left lower quadrant)    • Adverse reaction to drug    • Anxiety    • Bone pain    • Chest pain, midsternal    • Cough    • Current every day smoker    • Depression    • Encounter for smoking cessation counseling    • Hematuria    • High risk medication use    • Hyperglycemia    • Hyperlipidemia    • Irritability and anger    • Lymphoma (HCC)    • Proteinuria    • Sciatica    • Skin benign neoplasm    • Visual changes      Past Surgical History:   Procedure Laterality Date   • KNEE SURGERY Left    • KNEE SURGERY     • LYMPH NODE BIOPSY       Family History   Problem  Relation Age of Onset   • Heart attack Father    • No Known Problems Other      Social History     Tobacco Use   • Smoking status: Former Smoker     Quit date: 9/15/2020     Years since quittin.9   • Smokeless tobacco: Never Used   Substance Use Topics   • Alcohol use: Not Currently     Comment: sober for 20 yrs   • Drug use: No     Medications Prior to Admission   Medication Sig Dispense Refill Last Dose   • Magnesium 250 MG tablet Take  by mouth Every Night.   9/10/2022 at Unknown time     Allergies:    Allergies   Allergen Reactions   • Codeine Unknown (See Comments) and Nausea And Vomiting     UNKNOWN   • Fentanyl GI Intolerance       Objective    Objective     Vital Signs  Temp:  [96.9 °F (36.1 °C)-97.1 °F (36.2 °C)] 96.9 °F (36.1 °C)  Heart Rate:  [45-64] 55  Resp:  [16-17] 16  BP: (116-139)/(71-91) 139/82  SpO2:  [95 %-100 %] 100 %  on   ;   Device (Oxygen Therapy): room air  Body mass index is 26.54 kg/m².    Physical Exam  Vitals and nursing note reviewed.   Constitutional:       General: He is not in acute distress.     Appearance: He is well-developed. He is not diaphoretic.   HENT:      Head: Normocephalic and atraumatic.   Eyes:      General: No scleral icterus.     Conjunctiva/sclera: Conjunctivae normal.   Neck:      Vascular: No JVD.   Cardiovascular:      Rate and Rhythm: Normal rate and regular rhythm.      Heart sounds: Normal heart sounds. No murmur heard.  Pulmonary:      Effort: Pulmonary effort is normal. No respiratory distress.      Breath sounds: Normal breath sounds.   Abdominal:      General: Bowel sounds are normal.      Palpations: Abdomen is soft.      Tenderness: There is no abdominal tenderness.   Musculoskeletal:         General: Normal range of motion.      Cervical back: Tenderness (left neck with lymphadenopathy) present.   Skin:     General: Skin is warm and dry.   Neurological:      Mental Status: He is alert and oriented to person, place, and time.      Cranial Nerves: No  cranial nerve deficit.      Motor: No abnormal muscle tone.   Psychiatric:         Behavior: Behavior normal.         Thought Content: Thought content normal.     +dental carries    Results Review:  I reviewed the patient's new clinical results.  Discussed with ED provider.    Lab Results (last 24 hours)     Procedure Component Value Units Date/Time    Comprehensive Metabolic Panel [[id number]]  (Abnormal) Collected: 09/11/22 1147    Specimen: Blood Updated: 09/11/22 1249     Glucose 96 mg/dL      BUN 11 mg/dL      Creatinine 1.08 mg/dL      Sodium 137 mmol/L      Potassium 4.6 mmol/L      Chloride 102 mmol/L      CO2 27.6 mmol/L      Calcium 9.4 mg/dL      Total Protein 6.8 g/dL      Albumin 4.40 g/dL      ALT (SGPT) 35 U/L      AST (SGOT) 25 U/L      Alkaline Phosphatase 127 U/L      Total Bilirubin 0.5 mg/dL      Globulin 2.4 gm/dL      A/G Ratio 1.8 g/dL      BUN/Creatinine Ratio 10.2     Anion Gap 7.4 mmol/L      eGFR 77.1 mL/min/1.73      Comment: National Kidney Foundation and American Society of Nephrology (ASN) Task Force recommended calculation based on the Chronic Kidney Disease Epidemiology Collaboration (CKD-EPI) equation refit without adjustment for race.       Narrative:      GFR Normal >60  Chronic Kidney Disease <60  Kidney Failure <15      CBC & Differential [[id number]]  (Abnormal) Collected: 09/11/22 1147    Specimen: Blood Updated: 09/11/22 1233    Narrative:      The following orders were created for panel order CBC & Differential.  Procedure                               Abnormality         Status                     ---------                               -----------         ------                     CBC Auto Differential[[id number]]        Abnormal            Final result                 Please view results for these tests on the individual orders.    CBC Auto Differential [[id number]]  (Abnormal) Collected: 09/11/22 1147    Specimen: Blood Updated: 09/11/22 1233     WBC 5.57 10*3/mm3      RBC  "5.06 10*6/mm3      Hemoglobin 15.0 g/dL      Hematocrit 43.6 %      MCV 86.2 fL      MCH 29.6 pg      MCHC 34.4 g/dL      RDW 12.9 %      RDW-SD 40.7 fl      MPV 9.5 fL      Platelets 163 10*3/mm3      Neutrophil % 73.2 %      Lymphocyte % 17.1 %      Monocyte % 6.1 %      Eosinophil % 2.3 %      Basophil % 1.1 %      Immature Grans % 0.2 %      Neutrophils, Absolute 4.08 10*3/mm3      Lymphocytes, Absolute 0.95 10*3/mm3      Monocytes, Absolute 0.34 10*3/mm3      Eosinophils, Absolute 0.13 10*3/mm3      Basophils, Absolute 0.06 10*3/mm3      Immature Grans, Absolute 0.01 10*3/mm3      nRBC 0.0 /100 WBC     Lactic Acid, Plasma [[id number]]  (Normal) Collected: 09/11/22 1147    Specimen: Blood Updated: 09/11/22 1247     Lactate 1.1 mmol/L     Procalcitonin [[id number]]  (Normal) Collected: 09/11/22 1147    Specimen: Blood Updated: 09/11/22 1255     Procalcitonin 0.07 ng/mL     Narrative:      As a Marker for Sepsis (Non-Neonates):    1. <0.5 ng/mL represents a low risk of severe sepsis and/or septic shock.  2. >2 ng/mL represents a high risk of severe sepsis and/or septic shock.    As a Marker for Lower Respiratory Tract Infections that require antibiotic therapy:    PCT on Admission    Antibiotic Therapy       6-12 Hrs later    >0.5                Strongly Recommended  >0.25 - <0.5        Recommended   0.1 - 0.25          Discouraged              Remeasure/reassess PCT  <0.1                Strongly Discouraged     Remeasure/reassess PCT    As 28 day mortality risk marker: \"Change in Procalcitonin Result\" (>80% or <=80%) if Day 0 (or Day 1) and Day 4 values are available. Refer to http://www.[facility]-pct-calculator.com    Change in PCT <=80%  A decrease of PCT levels below or equal to 80% defines a positive change in PCT test result representing a higher risk for 28-day all-cause mortality of patients diagnosed with severe sepsis for septic shock.    Change in PCT >80%  A decrease of PCT levels of more than 80% defines a " negative change in PCT result representing a lower risk for 28-day all-cause mortality of patients diagnosed with severe sepsis or septic shock.             Imaging Results (Last 24 Hours)     Procedure Component Value Units Date/Time    CT Soft Tissue Neck With Contrast [[id number]] Collected: 09/11/22 1452     Updated: 09/11/22 1501    Narrative:      CT SCAN OF THE SOFT TISSUES OF THE NECK WAS OBTAINED WITH 3 MM AXIAL  IMAGES FOLLOWING ADMINISTRATION OF IV CONTRAST     CLINICAL HISTORY: History of non-Hodgkin lymphoma. Left neck swelling.     TECHNIQUE: CT scan of the soft tissues of the neck was obtained with 3  mm axial images following administration of IV contrast. Sagittal and  coronal reconstructed images were obtained.     FINDINGS:     There is a relatively low-attenuation soft tissue mass along the  anterior aspect of the left sternocleidomastoid muscle and along the  posterior aspect of the left submandibular gland which measures up to  approximately 2.3 x 2.2 cm in greatest axial dimensions. There is  injection of the adjacent fat. Additionally, there is a higher density  abnormality seen just inferior to this abnormality which is likely  representative of a pathologically enlarged left level 2A node measuring  up to approximately 1.3 x 1.8 cm in greatest axial dimensions.     Otherwise, the visualized contents of the cranial vault are  unremarkable. The orbits are within normal limits. The parotid glands,  submandibular glands, and sublingual glands are unremarkable. The  nasopharynx, oropharynx, and hypopharynx are unremarkable. The oral  cavity is within normal limits. The glottis and subglottic airway are  unremarkable. The trachea is within normal limits. The cervical  esophagus and visualized upper thoracic esophagus are unremarkable. The  thyroid gland is unremarkable. The visualized lung apices are remarkable  for bullous changes within the right lung apex. Additionally, there is  nonspecific  soft tissue density within the right lung apex which is  statistically likely representative of pleural and parenchymal scarring.  However, no previous similar studies are available for comparison to  ensure stability. A follow-up chest CT could be performed in an  approximate 6 month interval to ensure stability.       Impression:         There is a relatively low-attenuation mass along the anterior margins of  the left sternocleidomastoid muscle and along the posterior aspect of  the left submandibular gland measuring up to 2.3 x 2.2 cm in greatest  axial dimensions. Additionally, there is another higher density  abnormality seen just inferior to this aforementioned abnormality  measuring up to 1.3 x 1.8 cm in greatest axial dimensions. The  inferiorly situated abnormality is likely representative of an  abnormally enlarged left level 2A node. The more superiorly situated  abnormality could also be a pathologically enlarged lymph node with  cystic change or necrosis. However, another potential possibility is a  type II branchial cleft cyst. The findings could potentially represent  an infected branchial cleft cyst with adjacent inflammatory change and  lymphadenopathy. However, this process should be considered neoplastic  until proven otherwise. Further evaluation with tissue sampling is  suggested.     There is nonspecific soft tissue density within the right lung apex  which is statistically likely representative of pleural and parenchymal  scarring. However, no previous similar studies are available for  comparison to ensure stability. A follow-up chest CT could be performed  in an approximate 6 month interval to ensure stability.     These findings and recommendations were discussed with Dr. Hoskins on  09/11/2022 at approximately 1:53 PM.     Radiation dose reduction techniques were utilized, including automated  exposure control and exposure modulation based on body size.     This report was finalized on  9/11/2022 2:58 PM by Dr. Joseph Coleman M.D.       CT Chest With Contrast Diagnostic [[id number]] Collected: 09/11/22 1401     Updated: 09/11/22 1409    Narrative:      CT CHEST W CONTRAST DIAGNOSTIC-     INDICATIONS: Increased density at the right lung apex     TECHNIQUE: Radiation dose reduction techniques were utilized, including  automated exposure control and exposure modulation based on body size.  ENHANCED CHEST CT     COMPARISON: Correlated with chest x-ray from same date     FINDINGS:           The heart size is normal without pericardial effusion. Small pericardial  recess fluid is present. A few small subcentimeter short axis  mediastinal lymph nodes are seen that are not significant by size  criteria.     The airways appear clear.     No pleural effusion or pneumothorax.     The lungs show pleural-based densities at the right apex, for example  sagittal image 67, one measuring 1.7 x 0.5 cm, another measuring 0.8 x  0.6 cm, and in the area of emphysematous change, suspected to represent  nodular scarring, possibility of a pulmonary nodule not excluded, PET CT  correlation advised, interval follow-up can characterize change. Old  granulomatous disease is seen.     Upper abdominal structures no acute findings. Relative low-density of  the liver suggests steatosis.     Degenerative changes are seen in the spine. No acute fracture is  identified.       Impression:         Right apical pleural parenchymal changes suspected to represent nodular  scarring, possibility of pulmonary nodule/neoplasm not excluded, further  evaluation/follow-up recommended as indicated.     This report was finalized on 9/11/2022 2:06 PM by Dr. Dank Simmons M.D.       XR Chest 2 View [[id number]] Collected: 09/11/22 1212     Updated: 09/11/22 1221    Narrative:      STAT PA AND LATERAL RADIOGRAPHIC VIEWS OF THE CHEST     CLINICAL HISTORY: Neck swelling.     FINDINGS:     PA and lateral radiographic views of the chest were  obtained. No  previous similar studies are available for comparison. There is vague  increased density within the right lung apex which may simply represent  pleural and parenchymal scarring. However, I cannot exclude the  possibility of a pneumonia or mass at this site. Further evaluation with  a CT scan of the chest is recommended. The cardiomediastinal silhouette  is within normal limits. The osseous structures are incidentally notable  for degenerative phenomena within the thoracic spine.       Impression:         There is increased density within the right lung apex which may simply  represent pleural and parenchymal scarring. However, I cannot exclude  the possibility of pneumonia or a mass at this site. I recommend further  evaluation with a CT scan of the chest.     These findings and recommendations were discussed with Dr. Hoskins on  09/11/2022 at approximately 12:09 PM.     This report was finalized on 9/11/2022 12:18 PM by Dr. Joseph Coleman M.D.                 No orders to display        Assessment/Plan     Active Hospital Problems    Diagnosis  POA   • **Cervical lymphadenopathy [R59.0]  Yes   • Dental caries [K02.9]  Yes   • Abnormal findings on diagnostic imaging of lung [R91.8]  Yes   • History of non-Hodgkin's lymphoma [Z85.72]  Not Applicable     Formatting of this note might be different from the original.  Added automatically from request for surgery [id number]     • Hx antineoplastic chemotherapy [Z92.21]  Not Applicable       Etiology of swelling could be malignancy versus infection.  Certainly with his history of lymphoma do worry about malignancy/recurrence but with this developing over just 2 days as well as his dental caries do wonder about infection as well.      · He was started on IV antibiotics and can continue this.  · Will order IR BX for tissue and culture  · If improving could consider outpatient follow up with his Oncologist/ENT to follow up results. If worsening can consider inpatient  ENT consult.   · Outpatient follow up for Abnormal CT chest  · Will need to see dentist as outpatient.       VTE Prophylaxis - SCDs.  Code Status - Full code.       Gino Grady MD  [facility]  09/11/22  16:55 EDT

## 2022-09-11 NOTE — ED NOTES
Pt to er via pv with c/o left sided neck and throat swelling that started Friday. Pt denies sob at this time but does c/o difficulty swallowing.     Pt and RN wearing mask throughout encounter.

## 2022-09-11 NOTE — PLAN OF CARE
Problem: Adult Inpatient Plan of Care  Goal: Plan of Care Review  Outcome: Ongoing, Progressing  Flowsheets (Taken 9/11/2022 [id number])  Progress: no change  Plan of Care Reviewed With: patient  Outcome Evaluation: Patient admitted from ER with cervical lymphadenopathy. Upon arrival, patient was oriented to unit. IV antibiotic given. He reports pain in the neck/throat, but currently denies need for pain medication. Patient NPO at midnight for possible procedure tomorrow. Will continue to monitor.

## 2022-09-12 ENCOUNTER — READMISSION MANAGEMENT (OUTPATIENT)
Dept: CALL CENTER | Facility: HOSPITAL | Age: 62
End: 2022-09-12

## 2022-09-12 ENCOUNTER — APPOINTMENT (OUTPATIENT)
Dept: ULTRASOUND IMAGING | Facility: HOSPITAL | Age: 62
End: 2022-09-12

## 2022-09-12 VITALS
OXYGEN SATURATION: 98 % | HEIGHT: 70 IN | TEMPERATURE: 97.7 F | SYSTOLIC BLOOD PRESSURE: 108 MMHG | BODY MASS INDEX: 26.48 KG/M2 | DIASTOLIC BLOOD PRESSURE: 64 MMHG | WEIGHT: 185 LBS | HEART RATE: 64 BPM | RESPIRATION RATE: 18 BRPM

## 2022-09-12 LAB
ANION GAP SERPL CALCULATED.3IONS-SCNC: 7 MMOL/L (ref 5–15)
BASOPHILS # BLD AUTO: 0.06 10*3/MM3 (ref 0–0.2)
BASOPHILS NFR BLD AUTO: 1 % (ref 0–1.5)
BUN SERPL-MCNC: 10 MG/DL (ref 8–23)
BUN/CREAT SERPL: 10 (ref 7–25)
CALCIUM SPEC-SCNC: 8.8 MG/DL (ref 8.6–10.5)
CHLORIDE SERPL-SCNC: 105 MMOL/L (ref 98–107)
CO2 SERPL-SCNC: 26 MMOL/L (ref 22–29)
CREAT SERPL-MCNC: 1 MG/DL (ref 0.76–1.27)
DEPRECATED RDW RBC AUTO: 40.7 FL (ref 37–54)
EGFRCR SERPLBLD CKD-EPI 2021: 84.6 ML/MIN/1.73
EOSINOPHIL # BLD AUTO: 0.16 10*3/MM3 (ref 0–0.4)
EOSINOPHIL NFR BLD AUTO: 2.6 % (ref 0.3–6.2)
ERYTHROCYTE [DISTWIDTH] IN BLOOD BY AUTOMATED COUNT: 12.9 % (ref 12.3–15.4)
GLUCOSE SERPL-MCNC: 120 MG/DL (ref 65–99)
HCT VFR BLD AUTO: 41.5 % (ref 37.5–51)
HGB BLD-MCNC: 14.3 G/DL (ref 13–17.7)
IMM GRANULOCYTES # BLD AUTO: 0.02 10*3/MM3 (ref 0–0.05)
IMM GRANULOCYTES NFR BLD AUTO: 0.3 % (ref 0–0.5)
INR PPP: 1.01 (ref 0.9–1.1)
LYMPHOCYTES # BLD AUTO: 0.91 10*3/MM3 (ref 0.7–3.1)
LYMPHOCYTES NFR BLD AUTO: 14.7 % (ref 19.6–45.3)
MCH RBC QN AUTO: 29.8 PG (ref 26.6–33)
MCHC RBC AUTO-ENTMCNC: 34.5 G/DL (ref 31.5–35.7)
MCV RBC AUTO: 86.5 FL (ref 79–97)
MONOCYTES # BLD AUTO: 0.46 10*3/MM3 (ref 0.1–0.9)
MONOCYTES NFR BLD AUTO: 7.4 % (ref 5–12)
NEUTROPHILS NFR BLD AUTO: 4.57 10*3/MM3 (ref 1.7–7)
NEUTROPHILS NFR BLD AUTO: 74 % (ref 42.7–76)
NRBC BLD AUTO-RTO: 0 /100 WBC (ref 0–0.2)
PLATELET # BLD AUTO: 144 10*3/MM3 (ref 140–450)
PMV BLD AUTO: 9.4 FL (ref 6–12)
POTASSIUM SERPL-SCNC: 4.5 MMOL/L (ref 3.5–5.2)
PROTHROMBIN TIME: 13.2 SECONDS (ref 11.7–14.2)
RBC # BLD AUTO: 4.8 10*6/MM3 (ref 4.14–5.8)
SODIUM SERPL-SCNC: 138 MMOL/L (ref 136–145)
WBC NRBC COR # BLD: 6.18 10*3/MM3 (ref 3.4–10.8)

## 2022-09-12 PROCEDURE — 85025 COMPLETE CBC W/AUTO DIFF WBC: CPT | Performed by: INTERNAL MEDICINE

## 2022-09-12 PROCEDURE — 87070 CULTURE OTHR SPECIMN AEROBIC: CPT | Performed by: INTERNAL MEDICINE

## 2022-09-12 PROCEDURE — G0378 HOSPITAL OBSERVATION PER HR: HCPCS

## 2022-09-12 PROCEDURE — 87176 TISSUE HOMOGENIZATION CULTR: CPT | Performed by: INTERNAL MEDICINE

## 2022-09-12 PROCEDURE — 25010000002 AMPICILLIN-SULBACTAM PER 1.5 G: Performed by: INTERNAL MEDICINE

## 2022-09-12 PROCEDURE — 07B23ZX EXCISION OF LEFT NECK LYMPHATIC, PERCUTANEOUS APPROACH, DIAGNOSTIC: ICD-10-PCS | Performed by: RADIOLOGY

## 2022-09-12 PROCEDURE — 88305 TISSUE EXAM BY PATHOLOGIST: CPT | Performed by: INTERNAL MEDICINE

## 2022-09-12 PROCEDURE — 87015 SPECIMEN INFECT AGNT CONCNTJ: CPT | Performed by: INTERNAL MEDICINE

## 2022-09-12 PROCEDURE — 87075 CULTR BACTERIA EXCEPT BLOOD: CPT | Performed by: INTERNAL MEDICINE

## 2022-09-12 PROCEDURE — 85610 PROTHROMBIN TIME: CPT | Performed by: INTERNAL MEDICINE

## 2022-09-12 PROCEDURE — 87205 SMEAR GRAM STAIN: CPT | Performed by: INTERNAL MEDICINE

## 2022-09-12 PROCEDURE — 88312 SPECIAL STAINS GROUP 1: CPT | Performed by: INTERNAL MEDICINE

## 2022-09-12 PROCEDURE — 0 LIDOCAINE 1 % SOLUTION: Performed by: RADIOLOGY

## 2022-09-12 PROCEDURE — 76942 ECHO GUIDE FOR BIOPSY: CPT

## 2022-09-12 PROCEDURE — 80048 BASIC METABOLIC PNL TOTAL CA: CPT | Performed by: INTERNAL MEDICINE

## 2022-09-12 RX ORDER — LIDOCAINE HYDROCHLORIDE 10 MG/ML
10 INJECTION, SOLUTION INFILTRATION; PERINEURAL ONCE
Status: COMPLETED | OUTPATIENT
Start: 2022-09-12 | End: 2022-09-12

## 2022-09-12 RX ADMIN — LIDOCAINE HYDROCHLORIDE 4 ML: 10 INJECTION, SOLUTION INFILTRATION; PERINEURAL at 11:36

## 2022-09-12 RX ADMIN — AMPICILLIN SODIUM AND SULBACTAM SODIUM 3 G: 2; 1 INJECTION, POWDER, FOR SOLUTION INTRAMUSCULAR; INTRAVENOUS at 00:03

## 2022-09-12 RX ADMIN — AMPICILLIN SODIUM AND SULBACTAM SODIUM 3 G: 2; 1 INJECTION, POWDER, FOR SOLUTION INTRAMUSCULAR; INTRAVENOUS at 05:20

## 2022-09-12 RX ADMIN — AMPICILLIN SODIUM AND SULBACTAM SODIUM 3 G: 2; 1 INJECTION, POWDER, FOR SOLUTION INTRAMUSCULAR; INTRAVENOUS at 13:48

## 2022-09-12 RX ADMIN — Medication 10 ML: at 08:46

## 2022-09-12 NOTE — CASE MANAGEMENT/SOCIAL WORK
Case Management Discharge Note      Final Note: home         Selected Continued Care - Discharged on 9/12/2022 Admission date: 9/11/2022 - Discharge disposition: Home or Self Care    Destination    No services have been selected for the patient.              Durable Medical Equipment    No services have been selected for the patient.              Dialysis/Infusion    No services have been selected for the patient.              Home Medical Care    No services have been selected for the patient.              Therapy    No services have been selected for the patient.              Community Resources    No services have been selected for the patient.              Community & DME    No services have been selected for the patient.                       Final Discharge Disposition Code: 01 - home or self-care

## 2022-09-12 NOTE — PAYOR COMM NOTE
"Jackson Srinivasa (62 y.o. Male)     INPATIENT REQUEST FOR [id number]    CONTACT MORGAN CONNOR  P# [phone]  F# [phone]              Date of Birth   1960    Social Security Number       Address   [address] [place] [address]    Home Phone   [phone]    MRN   [id number]       Mu-ism   Unknown    Marital Status                               Admission Date   9/11/22    Admission Type   Emergency    Admitting Provider   Gino Grady MD    Attending Provider   Tony Reid MD    Department, Room/Bed   [address], 96/1       Discharge Date       Discharge Disposition       Discharge Destination                               Attending Provider: Tony Reid MD    Allergies: Codeine, Fentanyl    Isolation: None   Infection: None   Code Status: CPR   Advance Care Planning Activity    Ht: 177.8 cm (70\")   Wt: 83.9 kg (185 lb)    Admission Cmt: None   Principal Problem: Cervical lymphadenopathy [R59.0]                 Active Insurance as of 9/11/2022     Primary Coverage     Payor Plan Insurance Group Employer/Plan Group    [facility] [id number]     Payor Plan Address Payor Plan Phone Number Payor Plan Fax Number Effective Dates    [address] [address] [phone]  1/1/2019 - None Entered    [facility] [id number]       Subscriber Name Subscriber Birth Date Member ID       ELYSE CADENA 10/30/1931 [id number]           Secondary Coverage     Payor Plan Insurance Group Employer/Plan Group    MEDICARE MEDICARE A & B      Payor Plan Address Payor Plan Phone Number Payor Plan Fax Number Effective Dates    [address] [phone]  2/1/2019 - None Entered    [facility] [id number]       Subscriber Name Subscriber Birth Date Member ID       SRINIVASA CADENA 1960 [id number]                 Emergency Contacts      (Rel.) Home Phone Work Phone Mobile Phone    jenise cadenaky (Spouse) [phone] -- --            [place]: [facility] [id number]  Tax ID " [id number]     History & Physical      Baldo Beasley MD at 09/12/22 1033          H&P reviewed. The patient was examined and there are no changes to the H&P.          Electronically signed by Baldo Beasley MD at 09/12/22 1033   Source Note              Patient Name:  Srinivasa Paz  YOB: 1960  MRN:  [id number]  Admit Date:  9/11/2022  Patient Care Team:  Nita Gallegos MD as PCP - General (Family Medicine)      Subjective   History Present Illness     Chief Complaint   Patient presents with   • Neck Swelling       Mr. Paz is a 62 y.o. with a history of lymphoma seen by Dr. Rambo Muñiz with [facility], not currently on any treatment.  He presents with 2-day history of increased swelling of his left neck.  It has progressed and became more painful today.  He denies any fever or chills.  He went to see outpatient provider today and then was referred to the emergency room.  In the ER he had CT scan as well as was given IV antibiotics.  Of note he has had some poor dentition and does have dental caries on his left lower teeth.    History of Present Illness  Review of Systems   Constitutional: Negative.    HENT: Positive for dental problem.         +neck swelling   Eyes: Negative.    Respiratory: Negative.    Cardiovascular: Negative.    Gastrointestinal: Negative.    Endocrine: Negative.    Genitourinary: Negative.    Musculoskeletal: Negative.    Skin: Negative.    Allergic/Immunologic: Negative.    Neurological: Negative.    Hematological: Negative.    Psychiatric/Behavioral: Negative.         Personal History     Past Medical History:   Diagnosis Date   • Abdominal pain, LLQ (left lower quadrant)    • Adverse reaction to drug    • Anxiety    • Bone pain    • Chest pain, midsternal    • Cough    • Current every day smoker    • Depression    • Encounter for smoking cessation counseling    • Hematuria    • High risk medication use    • Hyperglycemia    • Hyperlipidemia    • Irritability and  anger    • Lymphoma (HCC)    • Proteinuria    • Sciatica    • Skin benign neoplasm    • Visual changes      Past Surgical History:   Procedure Laterality Date   • KNEE SURGERY Left    • KNEE SURGERY     • LYMPH NODE BIOPSY       Family History   Problem Relation Age of Onset   • Heart attack Father    • No Known Problems Other      Social History     Tobacco Use   • Smoking status: Former Smoker     Quit date: 9/15/2020     Years since quittin.9   • Smokeless tobacco: Never Used   Substance Use Topics   • Alcohol use: Not Currently     Comment: sober for 20 yrs   • Drug use: No     Medications Prior to Admission   Medication Sig Dispense Refill Last Dose   • Magnesium 250 MG tablet Take  by mouth Every Night.   9/10/2022 at Unknown time     Allergies:    Allergies   Allergen Reactions   • Codeine Unknown (See Comments) and Nausea And Vomiting     UNKNOWN   • Fentanyl GI Intolerance       Objective    Objective     Vital Signs  Temp:  [96.9 °F (36.1 °C)-97.1 °F (36.2 °C)] 96.9 °F (36.1 °C)  Heart Rate:  [45-64] 55  Resp:  [16-17] 16  BP: (116-139)/(71-91) 139/82  SpO2:  [95 %-100 %] 100 %  on   ;   Device (Oxygen Therapy): room air  Body mass index is 26.54 kg/m².    Physical Exam  Vitals and nursing note reviewed.   Constitutional:       General: He is not in acute distress.     Appearance: He is well-developed. He is not diaphoretic.   HENT:      Head: Normocephalic and atraumatic.   Eyes:      General: No scleral icterus.     Conjunctiva/sclera: Conjunctivae normal.   Neck:      Vascular: No JVD.   Cardiovascular:      Rate and Rhythm: Normal rate and regular rhythm.      Heart sounds: Normal heart sounds. No murmur heard.  Pulmonary:      Effort: Pulmonary effort is normal. No respiratory distress.      Breath sounds: Normal breath sounds.   Abdominal:      General: Bowel sounds are normal.      Palpations: Abdomen is soft.      Tenderness: There is no abdominal tenderness.   Musculoskeletal:         General:  Normal range of motion.      Cervical back: Tenderness (left neck with lymphadenopathy) present.   Skin:     General: Skin is warm and dry.   Neurological:      Mental Status: He is alert and oriented to person, place, and time.      Cranial Nerves: No cranial nerve deficit.      Motor: No abnormal muscle tone.   Psychiatric:         Behavior: Behavior normal.         Thought Content: Thought content normal.     +dental carries    Results Review:  I reviewed the patient's new clinical results.  Discussed with ED provider.    Lab Results (last 24 hours)     Procedure Component Value Units Date/Time    Comprehensive Metabolic Panel [[id number]]  (Abnormal) Collected: 09/11/22 1147    Specimen: Blood Updated: 09/11/22 1249     Glucose 96 mg/dL      BUN 11 mg/dL      Creatinine 1.08 mg/dL      Sodium 137 mmol/L      Potassium 4.6 mmol/L      Chloride 102 mmol/L      CO2 27.6 mmol/L      Calcium 9.4 mg/dL      Total Protein 6.8 g/dL      Albumin 4.40 g/dL      ALT (SGPT) 35 U/L      AST (SGOT) 25 U/L      Alkaline Phosphatase 127 U/L      Total Bilirubin 0.5 mg/dL      Globulin 2.4 gm/dL      A/G Ratio 1.8 g/dL      BUN/Creatinine Ratio 10.2     Anion Gap 7.4 mmol/L      eGFR 77.1 mL/min/1.73      Comment: National Kidney Foundation and American Society of Nephrology (ASN) Task Force recommended calculation based on the Chronic Kidney Disease Epidemiology Collaboration (CKD-EPI) equation refit without adjustment for race.       Narrative:      GFR Normal >60  Chronic Kidney Disease <60  Kidney Failure <15      CBC & Differential [[id number]]  (Abnormal) Collected: 09/11/22 1147    Specimen: Blood Updated: 09/11/22 1233    Narrative:      The following orders were created for panel order CBC & Differential.  Procedure                               Abnormality         Status                     ---------                               -----------         ------                     CBC Auto Differential[[id number]]         "Abnormal            Final result                 Please view results for these tests on the individual orders.    CBC Auto Differential [[id number]]  (Abnormal) Collected: 09/11/22 1147    Specimen: Blood Updated: 09/11/22 1233     WBC 5.57 10*3/mm3      RBC 5.06 10*6/mm3      Hemoglobin 15.0 g/dL      Hematocrit 43.6 %      MCV 86.2 fL      MCH 29.6 pg      MCHC 34.4 g/dL      RDW 12.9 %      RDW-SD 40.7 fl      MPV 9.5 fL      Platelets 163 10*3/mm3      Neutrophil % 73.2 %      Lymphocyte % 17.1 %      Monocyte % 6.1 %      Eosinophil % 2.3 %      Basophil % 1.1 %      Immature Grans % 0.2 %      Neutrophils, Absolute 4.08 10*3/mm3      Lymphocytes, Absolute 0.95 10*3/mm3      Monocytes, Absolute 0.34 10*3/mm3      Eosinophils, Absolute 0.13 10*3/mm3      Basophils, Absolute 0.06 10*3/mm3      Immature Grans, Absolute 0.01 10*3/mm3      nRBC 0.0 /100 WBC     Lactic Acid, Plasma [[id number]]  (Normal) Collected: 09/11/22 1147    Specimen: Blood Updated: 09/11/22 1247     Lactate 1.1 mmol/L     Procalcitonin [[id number]]  (Normal) Collected: 09/11/22 1147    Specimen: Blood Updated: 09/11/22 1255     Procalcitonin 0.07 ng/mL     Narrative:      As a Marker for Sepsis (Non-Neonates):    1. <0.5 ng/mL represents a low risk of severe sepsis and/or septic shock.  2. >2 ng/mL represents a high risk of severe sepsis and/or septic shock.    As a Marker for Lower Respiratory Tract Infections that require antibiotic therapy:    PCT on Admission    Antibiotic Therapy       6-12 Hrs later    >0.5                Strongly Recommended  >0.25 - <0.5        Recommended   0.1 - 0.25          Discouraged              Remeasure/reassess PCT  <0.1                Strongly Discouraged     Remeasure/reassess PCT    As 28 day mortality risk marker: \"Change in Procalcitonin Result\" (>80% or <=80%) if Day 0 (or Day 1) and Day 4 values are available. Refer to http://www.brahms-pct-calculator.com    Change in PCT <=80%  A decrease of PCT levels " below or equal to 80% defines a positive change in PCT test result representing a higher risk for 28-day all-cause mortality of patients diagnosed with severe sepsis for septic shock.    Change in PCT >80%  A decrease of PCT levels of more than 80% defines a negative change in PCT result representing a lower risk for 28-day all-cause mortality of patients diagnosed with severe sepsis or septic shock.             Imaging Results (Last 24 Hours)     Procedure Component Value Units Date/Time    CT Soft Tissue Neck With Contrast [[id number]] Collected: 09/11/22 1452     Updated: 09/11/22 1501    Narrative:      CT SCAN OF THE SOFT TISSUES OF THE NECK WAS OBTAINED WITH 3 MM AXIAL  IMAGES FOLLOWING ADMINISTRATION OF IV CONTRAST     CLINICAL HISTORY: History of non-Hodgkin lymphoma. Left neck swelling.     TECHNIQUE: CT scan of the soft tissues of the neck was obtained with 3  mm axial images following administration of IV contrast. Sagittal and  coronal reconstructed images were obtained.     FINDINGS:     There is a relatively low-attenuation soft tissue mass along the  anterior aspect of the left sternocleidomastoid muscle and along the  posterior aspect of the left submandibular gland which measures up to  approximately 2.3 x 2.2 cm in greatest axial dimensions. There is  injection of the adjacent fat. Additionally, there is a higher density  abnormality seen just inferior to this abnormality which is likely  representative of a pathologically enlarged left level 2A node measuring  up to approximately 1.3 x 1.8 cm in greatest axial dimensions.     Otherwise, the visualized contents of the cranial vault are  unremarkable. The orbits are within normal limits. The parotid glands,  submandibular glands, and sublingual glands are unremarkable. The  nasopharynx, oropharynx, and hypopharynx are unremarkable. The oral  cavity is within normal limits. The glottis and subglottic airway are  unremarkable. The trachea is within  normal limits. The cervical  esophagus and visualized upper thoracic esophagus are unremarkable. The  thyroid gland is unremarkable. The visualized lung apices are remarkable  for bullous changes within the right lung apex. Additionally, there is  nonspecific soft tissue density within the right lung apex which is  statistically likely representative of pleural and parenchymal scarring.  However, no previous similar studies are available for comparison to  ensure stability. A follow-up chest CT could be performed in an  approximate 6 month interval to ensure stability.       Impression:         There is a relatively low-attenuation mass along the anterior margins of  the left sternocleidomastoid muscle and along the posterior aspect of  the left submandibular gland measuring up to 2.3 x 2.2 cm in greatest  axial dimensions. Additionally, there is another higher density  abnormality seen just inferior to this aforementioned abnormality  measuring up to 1.3 x 1.8 cm in greatest axial dimensions. The  inferiorly situated abnormality is likely representative of an  abnormally enlarged left level 2A node. The more superiorly situated  abnormality could also be a pathologically enlarged lymph node with  cystic change or necrosis. However, another potential possibility is a  type II branchial cleft cyst. The findings could potentially represent  an infected branchial cleft cyst with adjacent inflammatory change and  lymphadenopathy. However, this process should be considered neoplastic  until proven otherwise. Further evaluation with tissue sampling is  suggested.     There is nonspecific soft tissue density within the right lung apex  which is statistically likely representative of pleural and parenchymal  scarring. However, no previous similar studies are available for  comparison to ensure stability. A follow-up chest CT could be performed  in an approximate 6 month interval to ensure stability.     These findings and  recommendations were discussed with Dr. Hoskins on  09/11/2022 at approximately 1:53 PM.     Radiation dose reduction techniques were utilized, including automated  exposure control and exposure modulation based on body size.     This report was finalized on 9/11/2022 2:58 PM by Dr. Joseph Coleman M.D.       CT Chest With Contrast Diagnostic [[id number]] Collected: 09/11/22 1401     Updated: 09/11/22 1409    Narrative:      CT CHEST W CONTRAST DIAGNOSTIC-     INDICATIONS: Increased density at the right lung apex     TECHNIQUE: Radiation dose reduction techniques were utilized, including  automated exposure control and exposure modulation based on body size.  ENHANCED CHEST CT     COMPARISON: Correlated with chest x-ray from same date     FINDINGS:           The heart size is normal without pericardial effusion. Small pericardial  recess fluid is present. A few small subcentimeter short axis  mediastinal lymph nodes are seen that are not significant by size  criteria.     The airways appear clear.     No pleural effusion or pneumothorax.     The lungs show pleural-based densities at the right apex, for example  sagittal image 67, one measuring 1.7 x 0.5 cm, another measuring 0.8 x  0.6 cm, and in the area of emphysematous change, suspected to represent  nodular scarring, possibility of a pulmonary nodule not excluded, PET CT  correlation advised, interval follow-up can characterize change. Old  granulomatous disease is seen.     Upper abdominal structures no acute findings. Relative low-density of  the liver suggests steatosis.     Degenerative changes are seen in the spine. No acute fracture is  identified.       Impression:         Right apical pleural parenchymal changes suspected to represent nodular  scarring, possibility of pulmonary nodule/neoplasm not excluded, further  evaluation/follow-up recommended as indicated.     This report was finalized on 9/11/2022 2:06 PM by Dr. Dank Simmons M.D.       XR Chest 2  View [[id number]] Collected: 09/11/22 1212     Updated: 09/11/22 1221    Narrative:      STAT PA AND LATERAL RADIOGRAPHIC VIEWS OF THE CHEST     CLINICAL HISTORY: Neck swelling.     FINDINGS:     PA and lateral radiographic views of the chest were obtained. No  previous similar studies are available for comparison. There is vague  increased density within the right lung apex which may simply represent  pleural and parenchymal scarring. However, I cannot exclude the  possibility of a pneumonia or mass at this site. Further evaluation with  a CT scan of the chest is recommended. The cardiomediastinal silhouette  is within normal limits. The osseous structures are incidentally notable  for degenerative phenomena within the thoracic spine.       Impression:         There is increased density within the right lung apex which may simply  represent pleural and parenchymal scarring. However, I cannot exclude  the possibility of pneumonia or a mass at this site. I recommend further  evaluation with a CT scan of the chest.     These findings and recommendations were discussed with Dr. Hoskins on  09/11/2022 at approximately 12:09 PM.     This report was finalized on 9/11/2022 12:18 PM by Dr. Joseph Coleman M.D.                 No orders to display        Assessment/Plan     Active Hospital Problems    Diagnosis  POA   • **Cervical lymphadenopathy [R59.0]  Yes   • Dental caries [K02.9]  Yes   • Abnormal findings on diagnostic imaging of lung [R91.8]  Yes   • History of non-Hodgkin's lymphoma [Z85.72]  Not Applicable     Formatting of this note might be different from the original.  Added automatically from request for surgery [id number]     • Hx antineoplastic chemotherapy [Z92.21]  Not Applicable       Etiology of swelling could be malignancy versus infection.  Certainly with his history of lymphoma do worry about malignancy/recurrence but with this developing over just 2 days as well as his dental caries do wonder about infection as  well.      · He was started on IV antibiotics and can continue this.  · Will order IR BX for tissue and culture  · If improving could consider outpatient follow up with his Oncologist/ENT to follow up results. If worsening can consider inpatient ENT consult.   · Outpatient follow up for Abnormal CT chest  · Will need to see dentist as outpatient.       VTE Prophylaxis - SCDs.  Code Status - Full code.       Gino Grady MD  [facility]  09/11/22  16:55 EDT      Electronically signed by Gino Grady MD at 09/11/22 1657             Gino Grady MD at 09/11/22 1643              Patient Name:  Srinivasa Paz  YOB: 1960  MRN:  [id number]  Admit Date:  9/11/2022  Patient Care Team:  Nita Gallegos MD as PCP - General (Family Medicine)      Subjective   History Present Illness     Chief Complaint   Patient presents with   • Neck Swelling       Mr. Paz is a 62 y.o. with a history of lymphoma seen by Dr. Rambo Muñiz with [facility], not currently on any treatment.  He presents with 2-day history of increased swelling of his left neck.  It has progressed and became more painful today.  He denies any fever or chills.  He went to see outpatient provider today and then was referred to the emergency room.  In the ER he had CT scan as well as was given IV antibiotics.  Of note he has had some poor dentition and does have dental caries on his left lower teeth.    History of Present Illness  Review of Systems   Constitutional: Negative.    HENT: Positive for dental problem.         +neck swelling   Eyes: Negative.    Respiratory: Negative.    Cardiovascular: Negative.    Gastrointestinal: Negative.    Endocrine: Negative.    Genitourinary: Negative.    Musculoskeletal: Negative.    Skin: Negative.    Allergic/Immunologic: Negative.    Neurological: Negative.    Hematological: Negative.    Psychiatric/Behavioral: Negative.         Personal History     Past Medical  History:   Diagnosis Date   • Abdominal pain, LLQ (left lower quadrant)    • Adverse reaction to drug    • Anxiety    • Bone pain    • Chest pain, midsternal    • Cough    • Current every day smoker    • Depression    • Encounter for smoking cessation counseling    • Hematuria    • High risk medication use    • Hyperglycemia    • Hyperlipidemia    • Irritability and anger    • Lymphoma (HCC)    • Proteinuria    • Sciatica    • Skin benign neoplasm    • Visual changes      Past Surgical History:   Procedure Laterality Date   • KNEE SURGERY Left    • KNEE SURGERY     • LYMPH NODE BIOPSY       Family History   Problem Relation Age of Onset   • Heart attack Father    • No Known Problems Other      Social History     Tobacco Use   • Smoking status: Former Smoker     Quit date: 9/15/2020     Years since quittin.9   • Smokeless tobacco: Never Used   Substance Use Topics   • Alcohol use: Not Currently     Comment: sober for 20 yrs   • Drug use: No     Medications Prior to Admission   Medication Sig Dispense Refill Last Dose   • Magnesium 250 MG tablet Take  by mouth Every Night.   9/10/2022 at Unknown time     Allergies:    Allergies   Allergen Reactions   • Codeine Unknown (See Comments) and Nausea And Vomiting     UNKNOWN   • Fentanyl GI Intolerance       Objective    Objective     Vital Signs  Temp:  [96.9 °F (36.1 °C)-97.1 °F (36.2 °C)] 96.9 °F (36.1 °C)  Heart Rate:  [45-64] 55  Resp:  [16-17] 16  BP: (116-139)/(71-91) 139/82  SpO2:  [95 %-100 %] 100 %  on   ;   Device (Oxygen Therapy): room air  Body mass index is 26.54 kg/m².    Physical Exam  Vitals and nursing note reviewed.   Constitutional:       General: He is not in acute distress.     Appearance: He is well-developed. He is not diaphoretic.   HENT:      Head: Normocephalic and atraumatic.   Eyes:      General: No scleral icterus.     Conjunctiva/sclera: Conjunctivae normal.   Neck:      Vascular: No JVD.   Cardiovascular:      Rate and Rhythm: Normal rate  and regular rhythm.      Heart sounds: Normal heart sounds. No murmur heard.  Pulmonary:      Effort: Pulmonary effort is normal. No respiratory distress.      Breath sounds: Normal breath sounds.   Abdominal:      General: Bowel sounds are normal.      Palpations: Abdomen is soft.      Tenderness: There is no abdominal tenderness.   Musculoskeletal:         General: Normal range of motion.      Cervical back: Tenderness (left neck with lymphadenopathy) present.   Skin:     General: Skin is warm and dry.   Neurological:      Mental Status: He is alert and oriented to person, place, and time.      Cranial Nerves: No cranial nerve deficit.      Motor: No abnormal muscle tone.   Psychiatric:         Behavior: Behavior normal.         Thought Content: Thought content normal.     +dental carries    Results Review:  I reviewed the patient's new clinical results.  Discussed with ED provider.    Lab Results (last 24 hours)     Procedure Component Value Units Date/Time    Comprehensive Metabolic Panel [[id number]]  (Abnormal) Collected: 09/11/22 1147    Specimen: Blood Updated: 09/11/22 1249     Glucose 96 mg/dL      BUN 11 mg/dL      Creatinine 1.08 mg/dL      Sodium 137 mmol/L      Potassium 4.6 mmol/L      Chloride 102 mmol/L      CO2 27.6 mmol/L      Calcium 9.4 mg/dL      Total Protein 6.8 g/dL      Albumin 4.40 g/dL      ALT (SGPT) 35 U/L      AST (SGOT) 25 U/L      Alkaline Phosphatase 127 U/L      Total Bilirubin 0.5 mg/dL      Globulin 2.4 gm/dL      A/G Ratio 1.8 g/dL      BUN/Creatinine Ratio 10.2     Anion Gap 7.4 mmol/L      eGFR 77.1 mL/min/1.73      Comment: National Kidney Foundation and American Society of Nephrology (ASN) Task Force recommended calculation based on the Chronic Kidney Disease Epidemiology Collaboration (CKD-EPI) equation refit without adjustment for race.       Narrative:      GFR Normal >60  Chronic Kidney Disease <60  Kidney Failure <15      CBC & Differential [[id number]]  (Abnormal)  Collected: 09/11/22 1147    Specimen: Blood Updated: 09/11/22 1233    Narrative:      The following orders were created for panel order CBC & Differential.  Procedure                               Abnormality         Status                     ---------                               -----------         ------                     CBC Auto Differential[[id number]]        Abnormal            Final result                 Please view results for these tests on the individual orders.    CBC Auto Differential [[id number]]  (Abnormal) Collected: 09/11/22 1147    Specimen: Blood Updated: 09/11/22 1233     WBC 5.57 10*3/mm3      RBC 5.06 10*6/mm3      Hemoglobin 15.0 g/dL      Hematocrit 43.6 %      MCV 86.2 fL      MCH 29.6 pg      MCHC 34.4 g/dL      RDW 12.9 %      RDW-SD 40.7 fl      MPV 9.5 fL      Platelets 163 10*3/mm3      Neutrophil % 73.2 %      Lymphocyte % 17.1 %      Monocyte % 6.1 %      Eosinophil % 2.3 %      Basophil % 1.1 %      Immature Grans % 0.2 %      Neutrophils, Absolute 4.08 10*3/mm3      Lymphocytes, Absolute 0.95 10*3/mm3      Monocytes, Absolute 0.34 10*3/mm3      Eosinophils, Absolute 0.13 10*3/mm3      Basophils, Absolute 0.06 10*3/mm3      Immature Grans, Absolute 0.01 10*3/mm3      nRBC 0.0 /100 WBC     Lactic Acid, Plasma [[id number]]  (Normal) Collected: 09/11/22 1147    Specimen: Blood Updated: 09/11/22 1247     Lactate 1.1 mmol/L     Procalcitonin [[id number]]  (Normal) Collected: 09/11/22 1147    Specimen: Blood Updated: 09/11/22 1255     Procalcitonin 0.07 ng/mL     Narrative:      As a Marker for Sepsis (Non-Neonates):    1. <0.5 ng/mL represents a low risk of severe sepsis and/or septic shock.  2. >2 ng/mL represents a high risk of severe sepsis and/or septic shock.    As a Marker for Lower Respiratory Tract Infections that require antibiotic therapy:    PCT on Admission    Antibiotic Therapy       6-12 Hrs later    >0.5                Strongly Recommended  >0.25 - <0.5        Recommended  "  0.1 - 0.25          Discouraged              Remeasure/reassess PCT  <0.1                Strongly Discouraged     Remeasure/reassess PCT    As 28 day mortality risk marker: \"Change in Procalcitonin Result\" (>80% or <=80%) if Day 0 (or Day 1) and Day 4 values are available. Refer to http://www.[email or web address] [place]pct[facility]cal[facility].com    Change in PCT <=80%  A decrease of PCT levels below or equal to 80% defines a positive change in PCT test result representing a higher risk for 28-day all-cause mortality of patients diagnosed with severe sepsis for septic shock.    Change in PCT >80%  A decrease of PCT levels of more than 80% defines a negative change in PCT result representing a lower risk for 28-day all-cause mortality of patients diagnosed with severe sepsis or septic shock.             Imaging Results (Last 24 Hours)     Procedure Component Value Units Date/Time    CT Soft Tissue Neck With Contrast [[id number]] Collected: 09/11/22 1452     Updated: 09/11/22 1501    Narrative:      CT SCAN OF THE SOFT TISSUES OF THE NECK WAS OBTAINED WITH 3 MM AXIAL  IMAGES FOLLOWING ADMINISTRATION OF IV CONTRAST     CLINICAL HISTORY: History of non-Hodgkin lymphoma. Left neck swelling.     TECHNIQUE: CT scan of the soft tissues of the neck was obtained with 3  mm axial images following administration of IV contrast. Sagittal and  coronal reconstructed images were obtained.     FINDINGS:     There is a relatively low-attenuation soft tissue mass along the  anterior aspect of the left sternocleidomastoid muscle and along the  posterior aspect of the left submandibular gland which measures up to  approximately 2.3 x 2.2 cm in greatest axial dimensions. There is  injection of the adjacent fat. Additionally, there is a higher density  abnormality seen just inferior to this abnormality which is likely  representative of a pathologically enlarged left level 2A node measuring  up to approximately 1.3 x 1.8 cm in greatest axial dimensions.   "   Otherwise, the visualized contents of the cranial vault are  unremarkable. The orbits are within normal limits. The parotid glands,  submandibular glands, and sublingual glands are unremarkable. The  nasopharynx, oropharynx, and hypopharynx are unremarkable. The oral  cavity is within normal limits. The glottis and subglottic airway are  unremarkable. The trachea is within normal limits. The cervical  esophagus and visualized upper thoracic esophagus are unremarkable. The  thyroid gland is unremarkable. The visualized lung apices are remarkable  for bullous changes within the right lung apex. Additionally, there is  nonspecific soft tissue density within the right lung apex which is  statistically likely representative of pleural and parenchymal scarring.  However, no previous similar studies are available for comparison to  ensure stability. A follow-up chest CT could be performed in an  approximate 6 month interval to ensure stability.       Impression:         There is a relatively low-attenuation mass along the anterior margins of  the left sternocleidomastoid muscle and along the posterior aspect of  the left submandibular gland measuring up to 2.3 x 2.2 cm in greatest  axial dimensions. Additionally, there is another higher density  abnormality seen just inferior to this aforementioned abnormality  measuring up to 1.3 x 1.8 cm in greatest axial dimensions. The  inferiorly situated abnormality is likely representative of an  abnormally enlarged left level 2A node. The more superiorly situated  abnormality could also be a pathologically enlarged lymph node with  cystic change or necrosis. However, another potential possibility is a  type II branchial cleft cyst. The findings could potentially represent  an infected branchial cleft cyst with adjacent inflammatory change and  lymphadenopathy. However, this process should be considered neoplastic  until proven otherwise. Further evaluation with tissue sampling  is  suggested.     There is nonspecific soft tissue density within the right lung apex  which is statistically likely representative of pleural and parenchymal  scarring. However, no previous similar studies are available for  comparison to ensure stability. A follow-up chest CT could be performed  in an approximate 6 month interval to ensure stability.     These findings and recommendations were discussed with Dr. Hoskins on  09/11/2022 at approximately 1:53 PM.     Radiation dose reduction techniques were utilized, including automated  exposure control and exposure modulation based on body size.     This report was finalized on 9/11/2022 2:58 PM by Dr. Joseph Coleman M.D.       CT Chest With Contrast Diagnostic [[id number]] Collected: 09/11/22 1401     Updated: 09/11/22 1409    Narrative:      CT CHEST W CONTRAST DIAGNOSTIC-     INDICATIONS: Increased density at the right lung apex     TECHNIQUE: Radiation dose reduction techniques were utilized, including  automated exposure control and exposure modulation based on body size.  ENHANCED CHEST CT     COMPARISON: Correlated with chest x-ray from same date     FINDINGS:           The heart size is normal without pericardial effusion. Small pericardial  recess fluid is present. A few small subcentimeter short axis  mediastinal lymph nodes are seen that are not significant by size  criteria.     The airways appear clear.     No pleural effusion or pneumothorax.     The lungs show pleural-based densities at the right apex, for example  sagittal image 67, one measuring 1.7 x 0.5 cm, another measuring 0.8 x  0.6 cm, and in the area of emphysematous change, suspected to represent  nodular scarring, possibility of a pulmonary nodule not excluded, PET CT  correlation advised, interval follow-up can characterize change. Old  granulomatous disease is seen.     Upper abdominal structures no acute findings. Relative low-density of  the liver suggests steatosis.     Degenerative  changes are seen in the spine. No acute fracture is  identified.       Impression:         Right apical pleural parenchymal changes suspected to represent nodular  scarring, possibility of pulmonary nodule/neoplasm not excluded, further  evaluation/follow-up recommended as indicated.     This report was finalized on 9/11/2022 2:06 PM by Dr. Dank Simmons M.D.       XR Chest 2 View [[id number]] Collected: 09/11/22 1212     Updated: 09/11/22 1221    Narrative:      STAT PA AND LATERAL RADIOGRAPHIC VIEWS OF THE CHEST     CLINICAL HISTORY: Neck swelling.     FINDINGS:     PA and lateral radiographic views of the chest were obtained. No  previous similar studies are available for comparison. There is vague  increased density within the right lung apex which may simply represent  pleural and parenchymal scarring. However, I cannot exclude the  possibility of a pneumonia or mass at this site. Further evaluation with  a CT scan of the chest is recommended. The cardiomediastinal silhouette  is within normal limits. The osseous structures are incidentally notable  for degenerative phenomena within the thoracic spine.       Impression:         There is increased density within the right lung apex which may simply  represent pleural and parenchymal scarring. However, I cannot exclude  the possibility of pneumonia or a mass at this site. I recommend further  evaluation with a CT scan of the chest.     These findings and recommendations were discussed with Dr. Hoskins on  09/11/2022 at approximately 12:09 PM.     This report was finalized on 9/11/2022 12:18 PM by Dr. Joseph Coleman M.D.                 No orders to display        Assessment/Plan     Active Hospital Problems    Diagnosis  POA   • **Cervical lymphadenopathy [R59.0]  Yes   • Dental caries [K02.9]  Yes   • Abnormal findings on diagnostic imaging of lung [R91.8]  Yes   • History of non-Hodgkin's lymphoma [Z85.72]  Not Applicable     Formatting of this note might be  different from the original.  Added automatically from request for surgery [id number]     • Hx antineoplastic chemotherapy [Z92.21]  Not Applicable       Etiology of swelling could be malignancy versus infection.  Certainly with his history of lymphoma do worry about malignancy/recurrence but with this developing over just 2 days as well as his dental caries do wonder about infection as well.      · He was started on IV antibiotics and can continue this.  · Will order IR BX for tissue and culture  · If improving could consider outpatient follow up with his Oncologist/ENT to follow up results. If worsening can consider inpatient ENT consult.   · Outpatient follow up for Abnormal CT chest  · Will need to see dentist as outpatient.       VTE Prophylaxis - SCDs.  Code Status - Full code.       Gino Grady MD  [facility]  09/11/22  16:55 EDT      Electronically signed by Gino Grady MD at 09/11/22 1657          Emergency Department Notes      Joanna Farmer RN at 09/11/22 1107        Pt to er via pv with c/o left sided neck and throat swelling that started Friday. Pt denies sob at this time but does c/o difficulty swallowing.     Pt and RN wearing mask throughout encounter.    Electronically signed by Joanna Farmer RN at 09/11/22 1108     Ld Hoskins MD at 09/11/22 1140          Subjective   PIT    62-year-old male with past medical history of lymphoma, hyperglycemia, hyperlipidemia here for chief complaint of left-sided neck swelling.  History was obtained from the patient.  The patient states that this started approximately 2 days ago.  He states that he has not had any pain in his tooth.  He states that today he woke up and was hurting more therefore he decided come in.  He states that he is noticed some swelling.  He denies any shortness of breath.  He states that he does have some mild pain when he swallows.  Patient denies any chest pain, shortness of breath, abdominal  pain, nausea, vomiting, diarrhea, fevers, chills, rashes, night sweats, recent weight loss.          Review of Systems   All other systems reviewed and are negative.      Past Medical History:   Diagnosis Date   • Abdominal pain, LLQ (left lower quadrant)    • Adverse reaction to drug    • Anxiety    • Bone pain    • Chest pain, midsternal    • Cough    • Current every day smoker    • Depression    • Encounter for smoking cessation counseling    • Hematuria    • High risk medication use    • Hyperglycemia    • Hyperlipidemia    • Irritability and anger    • Lymphoma (HCC)    • Proteinuria    • Sciatica    • Skin benign neoplasm    • Visual changes        Allergies   Allergen Reactions   • Codeine Unknown (See Comments) and Nausea And Vomiting     UNKNOWN   • Fentanyl GI Intolerance       Past Surgical History:   Procedure Laterality Date   • KNEE SURGERY Left    • KNEE SURGERY     • LYMPH NODE BIOPSY         Family History   Problem Relation Age of Onset   • Heart attack Father    • No Known Problems Other        Social History     Socioeconomic History   • Marital status:    Tobacco Use   • Smoking status: Former Smoker     Quit date: 9/15/2020     Years since quittin.9   • Smokeless tobacco: Never Used   Substance and Sexual Activity   • Alcohol use: Not Currently     Comment: sober for 20 yrs   • Drug use: No   • Sexual activity: Yes           Objective   Physical Exam  Constitutional:       General: He is not in acute distress.     Appearance: He is not ill-appearing, toxic-appearing or diaphoretic.   HENT:      Head: Normocephalic and atraumatic.      Right Ear: External ear normal.      Left Ear: External ear normal.      Nose: Nose normal. No congestion or rhinorrhea.      Mouth/Throat:      Mouth: Mucous membranes are moist.      Pharynx: Oropharynx is clear. No oropharyngeal exudate or posterior oropharyngeal erythema.      Comments: Patient has swelling noted to the left side of the neck,  patient can open his mouth without any issues, no pain to palpation of the tongue  Eyes:      General: No scleral icterus.        Right eye: No discharge.         Left eye: No discharge.      Extraocular Movements: Extraocular movements intact.      Conjunctiva/sclera: Conjunctivae normal.      Pupils: Pupils are equal, round, and reactive to light.   Neck:      Comments: No swelling noted to the right side of the neck  Cardiovascular:      Rate and Rhythm: Normal rate and regular rhythm.      Pulses: Normal pulses.      Heart sounds: Normal heart sounds. No murmur heard.    No friction rub. No gallop.   Pulmonary:      Effort: Pulmonary effort is normal. No respiratory distress.      Breath sounds: Normal breath sounds. No stridor. No wheezing, rhonchi or rales.   Chest:      Chest wall: No tenderness.   Abdominal:      General: Abdomen is flat. There is no distension.      Palpations: Abdomen is soft.      Tenderness: There is no abdominal tenderness. There is no right CVA tenderness, left CVA tenderness, guarding or rebound.   Musculoskeletal:         General: No swelling, tenderness, deformity or signs of injury. Normal range of motion.      Cervical back: Normal range of motion and neck supple. No rigidity.      Right lower leg: No edema.      Left lower leg: No edema.   Skin:     General: Skin is warm and dry.      Capillary Refill: Capillary refill takes less than 2 seconds.      Coloration: Skin is not jaundiced or pale.      Findings: No bruising, erythema, lesion or rash.   Neurological:      Mental Status: He is alert and oriented to person, place, and time.      Sensory: No sensory deficit.      Motor: No weakness.   Psychiatric:         Mood and Affect: Mood normal.         Behavior: Behavior normal.         Procedures          ED Course                                           MDM  Number of Diagnoses or Management Options     Amount and/or Complexity of Data Reviewed  Clinical lab tests: ordered and  reviewed  Tests in the radiology section of CPT®: ordered and reviewed    Risk of Complications, Morbidity, and/or Mortality  General comments: When I first saw the patient, the patient appeared nontoxic.  Patient did have cervical lymphadenopathy on the left side of his neck.  I was concerned that this could be infection secondary to his tooth that had dental caries versus a brachial Cyst versus his recurrence of his previous lymphoma.  Given this, IV was started and labs were obtained.  Labs are grossly unremarkable.  I did order a chest x-ray that was read as possible scarring in the upper lobes versus a mass.  Therefore I did order a CT of the chest.  The CT neck with contrast was read as possible infection versus neoplasm could not be excluded.  This was done by Dr. Coleman.  The CT Chest showed scarring vs pulm nodule/neoplasm.  The pt is stable from an airway standpoint in the ed. patient was given 1 dose of IV Unasyn in case this was an infection in the ED I spoke to Dr. Grady with [facility] who has accepted the pt.  I defer all further management of the patient to the care of [facility].         Final diagnoses:   Cervical lymphadenopathy   Neck swelling   History of lymphoma   Dental caries       ED Disposition  ED Disposition     ED Disposition   Decision to Admit    Condition   --    Comment   Level of Care: Med/Surg [1]   Diagnosis: Cervical lymphadenopathy [[id number]]   Admitting Physician: GIULIA GRADY [[id number]]   Attending Physician: GIULIA GRADY [[id number]]   Certification: I Certify That Inpatient Hospital Services Are Medically Necessary For Greater Than 2 Midnights               No follow-up provider specified.       Medication List      No changes were made to your prescriptions during this visit.          Ld Hoskins MD  09/11/22 1441       Ld Hoskins MD  09/11/22 1442      Electronically signed by Ld Hoskins MD at 09/11/22 1442     Azul Reid, RN at 09/11/22 1446          Nursing  report ED to floor  Srinivasa Paz  62 y.o.  male    HPI :   Chief Complaint   Patient presents with   • Neck Swelling       Admitting doctor:   Gino Grady MD    Admitting diagnosis:   The primary encounter diagnosis was Cervical lymphadenopathy. Diagnoses of Neck swelling, History of lymphoma, and Dental caries were also pertinent to this visit.    Code status:   Current Code Status     Date Active Code Status Order ID Comments User Context       Not on file    Advance Care Planning Activity          Allergies:   Codeine and Fentanyl    Intake and Output    Intake/Output Summary (Last 24 hours) at 9/11/2022 1446  Last data filed at 9/11/2022 1445  Gross per 24 hour   Intake 100 ml   Output --   Net 100 ml       Weight:       09/11/22  1134   Weight: 83.9 kg (185 lb)       Most recent vitals:   Vitals:    09/11/22 1231 09/11/22 1301 09/11/22 1401 09/11/22 1431   BP: 122/75 124/71 131/76 130/86   Pulse: (!) 45 50 (!) 47 50   Resp:  16 16 16   Temp:       SpO2: 99% 99% 100% 100%   Weight:       Height:           Active LDAs/IV Access:   Lines, Drains & Airways     Active LDAs     Name Placement date Placement time Site Days    Peripheral IV 09/11/22 1147 Right Antecubital 09/11/22  1147  Antecubital  less than 1                Labs (abnormal labs have a star):   Labs Reviewed   COMPREHENSIVE METABOLIC PANEL - Abnormal; Notable for the following components:       Result Value    Alkaline Phosphatase 127 (*)     All other components within normal limits    Narrative:     GFR Normal >60  Chronic Kidney Disease <60  Kidney Failure <15     CBC WITH AUTO DIFFERENTIAL - Abnormal; Notable for the following components:    Lymphocyte % 17.1 (*)     All other components within normal limits   LACTIC ACID, PLASMA - Normal   PROCALCITONIN - Normal    Narrative:     As a Marker for Sepsis (Non-Neonates):    1. <0.5 ng/mL represents a low risk of severe sepsis and/or septic shock.  2. >2 ng/mL represents a high risk of severe  "sepsis and/or septic shock.    As a Marker for Lower Respiratory Tract Infections that require antibiotic therapy:    PCT on Admission    Antibiotic Therapy       6-12 Hrs later    >0.5                Strongly Recommended  >0.25 - <0.5        Recommended   0.1 - 0.25          Discouraged              Remeasure/reassess PCT  <0.1                Strongly Discouraged     Remeasure/reassess PCT    As 28 day mortality risk marker: \"Change in Procalcitonin Result\" (>80% or <=80%) if Day 0 (or Day 1) and Day 4 values are available. Refer to http://www.[email or web address]-pct-[email or web address].com    Change in PCT <=80%  A decrease of PCT levels below or equal to 80% defines a positive change in PCT test result representing a higher risk for 28-day all-cause mortality of patients diagnosed with severe sepsis for septic shock.    Change in PCT >80%  A decrease of PCT levels of more than 80% defines a negative change in PCT result representing a lower risk for 28-day all-cause mortality of patients diagnosed with severe sepsis or septic shock.      CBC AND DIFFERENTIAL    Narrative:     The following orders were created for panel order CBC & Differential.  Procedure                               Abnormality         Status                     ---------                               -----------         ------                     CBC Auto Differential[[id number]]        Abnormal            Final result                 Please view results for these tests on the individual orders.       EKG:   No orders to display       Meds given in ED:   Medications   sodium chloride 0.9 % flush 10 mL (has no administration in time range)   iopamidol (ISOVUE-300) 61 % injection 100 mL (85 mL Intravenous Given 9/11/22 1334)   ampicillin-sulbactam (UNASYN) 3 g in sodium chloride 0.9 % 100 mL IVPB-VTB (0 g Intravenous Stopped 9/11/22 1445)       Imaging results:  XR Chest 2 View    Result Date: 9/11/2022   There is increased density within the right lung apex which may " simply represent pleural and parenchymal scarring. However, I cannot exclude the possibility of pneumonia or a mass at this site. I recommend further evaluation with a CT scan of the chest.  These findings and recommendations were discussed with Dr. Hoskins on 2022 at approximately 12:09 PM.  This report was finalized on 2022 12:18 PM by Dr. Joseph Coleman M.D.      CT Chest With Contrast Diagnostic    Result Date: 2022   Right apical pleural parenchymal changes suspected to represent nodular scarring, possibility of pulmonary nodule/neoplasm not excluded, further evaluation/follow-up recommended as indicated.  This report was finalized on 2022 2:06 PM by Dr. Dank Simmons M.D.        Ambulatory status:   - Independent    Social issues:   Social History     Socioeconomic History   • Marital status:    Tobacco Use   • Smoking status: Former Smoker     Quit date: 9/15/2020     Years since quittin.9   • Smokeless tobacco: Never Used   Substance and Sexual Activity   • Alcohol use: Not Currently     Comment: sober for 20 yrs   • Drug use: No   • Sexual activity: Yes       NIH Stroke Scale:        Nursing report ED to floor:      Electronically signed by Azul Reid, RN at 22 1446     Azul Reid, RN at 22 1618        Initially filed in error    Electronically signed by Azul Reid, RN at 22 1619       Operative/Procedure Notes (last 48 hours)  Notes from 09/10/22 1309 through 22 1309   No notes of this type exist for this encounter.         Physician Progress Notes (last 48 hours)  Notes from 09/10/22 1309 through 22 1309   No notes of this type exist for this encounter.       Consult Notes (last 48 hours)  Notes from 09/10/22 1309 through 22 1309   No notes of this type exist for this encounter.

## 2022-09-12 NOTE — OUTREACH NOTE
Prep Survey    Flowsheet Row Responses   [facility] facility patient discharged from? [facility]   Is LACE score < 7 ? Yes   Emergency Room discharge w/ pulse ox? No   Eligibility [facility]   Date of Admission 09/11/22   Date of Discharge 09/12/22   Discharge Disposition Home or Self Care   Discharge diagnosis Cervical lymphadenopathy   Does the patient have one of the following disease processes/diagnoses(primary or secondary)? Other   Does the patient have Home health ordered? No   Is there a DME ordered? No   Prep survey completed? Yes          CAMILA QUIÑONEZ - Registered Nurse

## 2022-09-12 NOTE — CASE MANAGEMENT/SOCIAL WORK
Discharge Planning Assessment  [facility]     Patient Name: Srinivasa Paz  MRN: [id number]  Today's Date: 9/12/2022    Admit Date: 9/11/2022     Discharge Needs Assessment     Row Name 09/12/22 1525       Living Environment    People in Home spouse    Current Living Arrangements home    Primary Care Provided by self    Provides Primary Care For no one    Family Caregiver if Needed spouse    Quality of Family Relationships helpful;involved;supportive       Resource/Environmental Concerns    Resource/Environmental Concerns none       Transition Planning    Patient/Family Anticipates Transition to home with family    Patient/Family Anticipated Services at Transition none    Transportation Anticipated family or friend will provide       Discharge Needs Assessment    Readmission Within the Last 30 Days no previous admission in last 30 days    Equipment Currently Used at Home none               Discharge Plan     Row Name 09/12/22 1525       Plan    Plan Home with spouse    Patient/Family in Agreement with Plan yes    Plan Comments CCP met with patient and spouse at bedside to verify facesheet and CCP role explained. Patient being discharged home today. The patient resides with his wife and has 3 LEONARDO. He is IADL's and has no DME. No history of HH/SNF. Spouse to transport at discharge. Liane MAURICIO RN CCP    Row Name 09/12/22 1453       Plan    Final Discharge Disposition Code 01 - home or self-care    Final Note home              Continued Care and Services - Discharged on 9/12/2022 Admission date: 9/11/2022 - Discharge disposition: Home or Self Care   Coordination has not been started for this encounter.       Expected Discharge Date and Time     Expected Discharge Date Expected Discharge Time    Sep 12, 2022          Demographic Summary     Row Name 09/12/22 1524       General Information    Arrived From home    Preferred Language English               Functional Status     Row Name 09/12/22 1524       Functional Status     Usual Activity Tolerance good    Current Activity Tolerance good       Functional Status, IADL    Medications independent    Meal Preparation independent    Housekeeping independent    Laundry independent    Shopping independent               Psychosocial    No documentation.                Abuse/Neglect    No documentation.                Legal    No documentation.                Substance Abuse    No documentation.                Patient Forms    No documentation.                   Liane Montemayor RN

## 2022-09-12 NOTE — DISCHARGE SUMMARY
Patient Name: Srinivasa Paz  : 1960  MRN: [id number]    Date of Admission: 2022  Date of Discharge:  2022  Primary Care Physician: Nita Gallegos MD      Chief Complaint:   Neck Swelling      Discharge Diagnoses     Active Hospital Problems    Diagnosis  POA   • **Cervical lymphadenopathy [R59.0]  Yes   • Dental caries [K02.9]  Yes   • Abnormal findings on diagnostic imaging of lung [R91.8]  Yes   • History of non-Hodgkin's lymphoma [Z85.72]  Not Applicable   • Hx antineoplastic chemotherapy [Z92.21]  Not Applicable      Resolved Hospital Problems   No resolved problems to display.        Brief Admitting HPI     Mr. Paz is a 62 y.o. with a history of lymphoma seen by Dr. Rambo Muñiz with [facility], not currently on any treatment.  He presents with 2-day history of increased swelling of his left neck.  It has progressed and became more painful today.  He denies any fever or chills.  He went to see outpatient provider today and then was referred to the emergency room.  In the ER he had CT scan as well as was given IV antibiotics.  Of note he has had some poor dentition and does have dental caries on his left lower teeth.    Hospital Course     Pt admitted for 2-day increased swelling of his left neck.  Patient underwent IR guided biopsy.  Patient was originally started on antibiotics in the emergency department, however we will not discharge him with any as he is afebrile, without a white count, and no drainable abscess seen during biopsy.  Discussed with patient to return to the ED if worsening fever, chills, increased swelling of his neck, or any respiratory compromise.  Plan to follow-up with his regular oncologist Dr. Rambo Muñiz with [facility].  Biopsy results pending at time of discharge.      ADDENDUM 22:  -Biopsy consistent with abscess.  Called patient and told him that we would write Rx for 10 days of Augmentin.  Patient states that he has follow-up with dentistry in  1 week.      Discharge Plan     L neck bx  -f/u with Dr Rambo Muñiz regarding bx results    Abnormal CT  -CT Chest/Neck (911/22):  nonspecific soft tissue density within the right lung apex which is statistically likely representative of pleural and parenchymal scarring. However, no previous similar studies are available forcomparison to ensure stability. A follow-up chest CT could be performedin an approximate 6 month interval to ensure stability. (around 3/12/22)      Day of Discharge     Subjective:  Ready for discharge.    Physical Exam:     Body mass index is 26.54 kg/m².  Physical Exam  Constitutional:       Appearance: Normal appearance.   HENT:      Head: Normocephalic and atraumatic.      Mouth/Throat:      Mouth: Mucous membranes are moist.      Pharynx: No oropharyngeal exudate or posterior oropharyngeal erythema.   Eyes:      Extraocular Movements: Extraocular movements intact.      Pupils: Pupils are equal, round, and reactive to light.   Neck:      Comments: bx site covered with dressing  Cardiovascular:      Rate and Rhythm: Normal rate and regular rhythm.      Heart sounds: No murmur heard.    No gallop.   Pulmonary:      Effort: Pulmonary effort is normal. No respiratory distress.      Breath sounds: Normal breath sounds. No wheezing.   Abdominal:      General: Abdomen is flat. There is no distension.      Palpations: Abdomen is soft.      Tenderness: There is no abdominal tenderness. There is no guarding.   Musculoskeletal:         General: No swelling, tenderness or deformity. Normal range of motion.   Skin:     General: Skin is warm.      Coloration: Skin is not jaundiced.      Findings: No bruising.   Neurological:      General: No focal deficit present.      Mental Status: He is alert and oriented to person, place, and time.      Cranial Nerves: No cranial nerve deficit.   Psychiatric:         Mood and Affect: Mood normal.         Behavior: Behavior normal.         Consultants     Consult  Orders (all) (From admission, onward)     Start     Ordered    09/11/22 1359  [facility] (on-call MD unless specified) Details  Once        Specialty:  Hospitalist  Provider:  (Not yet assigned)    09/11/22 1358              Procedures     * Surgery not found *      Imaging Results (All)     Procedure Component Value Units Date/Time    US Guided Tissue Biopsy [[id number]] Resulted: 09/12/22 1149     Updated: 09/12/22 1204    CT Soft Tissue Neck With Contrast [[id number]] Collected: 09/11/22 1452     Updated: 09/11/22 1501    Narrative:      CT SCAN OF THE SOFT TISSUES OF THE NECK WAS OBTAINED WITH 3 MM AXIAL  IMAGES FOLLOWING ADMINISTRATION OF IV CONTRAST     CLINICAL HISTORY: History of non-Hodgkin lymphoma. Left neck swelling.     TECHNIQUE: CT scan of the soft tissues of the neck was obtained with 3  mm axial images following administration of IV contrast. Sagittal and  coronal reconstructed images were obtained.     FINDINGS:     There is a relatively low-attenuation soft tissue mass along the  anterior aspect of the left sternocleidomastoid muscle and along the  posterior aspect of the left submandibular gland which measures up to  approximately 2.3 x 2.2 cm in greatest axial dimensions. There is  injection of the adjacent fat. Additionally, there is a higher density  abnormality seen just inferior to this abnormality which is likely  representative of a pathologically enlarged left level 2A node measuring  up to approximately 1.3 x 1.8 cm in greatest axial dimensions.     Otherwise, the visualized contents of the cranial vault are  unremarkable. The orbits are within normal limits. The parotid glands,  submandibular glands, and sublingual glands are unremarkable. The  nasopharynx, oropharynx, and hypopharynx are unremarkable. The oral  cavity is within normal limits. The glottis and subglottic airway are  unremarkable. The trachea is within normal limits. The cervical  esophagus and visualized upper thoracic esophagus  are unremarkable. The  thyroid gland is unremarkable. The visualized lung apices are remarkable  for bullous changes within the right lung apex. Additionally, there is  nonspecific soft tissue density within the right lung apex which is  statistically likely representative of pleural and parenchymal scarring.  However, no previous similar studies are available for comparison to  ensure stability. A follow-up chest CT could be performed in an  approximate 6 month interval to ensure stability.       Impression:         There is a relatively low-attenuation mass along the anterior margins of  the left sternocleidomastoid muscle and along the posterior aspect of  the left submandibular gland measuring up to 2.3 x 2.2 cm in greatest  axial dimensions. Additionally, there is another higher density  abnormality seen just inferior to this aforementioned abnormality  measuring up to 1.3 x 1.8 cm in greatest axial dimensions. The  inferiorly situated abnormality is likely representative of an  abnormally enlarged left level 2A node. The more superiorly situated  abnormality could also be a pathologically enlarged lymph node with  cystic change or necrosis. However, another potential possibility is a  type II branchial cleft cyst. The findings could potentially represent  an infected branchial cleft cyst with adjacent inflammatory change and  lymphadenopathy. However, this process should be considered neoplastic  until proven otherwise. Further evaluation with tissue sampling is  suggested.     There is nonspecific soft tissue density within the right lung apex  which is statistically likely representative of pleural and parenchymal  scarring. However, no previous similar studies are available for  comparison to ensure stability. A follow-up chest CT could be performed  in an approximate 6 month interval to ensure stability.     These findings and recommendations were discussed with Dr. Hoskins on  09/11/2022 at approximately  1:53 PM.     Radiation dose reduction techniques were utilized, including automated  exposure control and exposure modulation based on body size.     This report was finalized on 9/11/2022 2:58 PM by Dr. Joseph Coleman M.D.       CT Chest With Contrast Diagnostic [[id number]] Collected: 09/11/22 1401     Updated: 09/11/22 1409    Narrative:      CT CHEST W CONTRAST DIAGNOSTIC-     INDICATIONS: Increased density at the right lung apex     TECHNIQUE: Radiation dose reduction techniques were utilized, including  automated exposure control and exposure modulation based on body size.  ENHANCED CHEST CT     COMPARISON: Correlated with chest x-ray from same date     FINDINGS:           The heart size is normal without pericardial effusion. Small pericardial  recess fluid is present. A few small subcentimeter short axis  mediastinal lymph nodes are seen that are not significant by size  criteria.     The airways appear clear.     No pleural effusion or pneumothorax.     The lungs show pleural-based densities at the right apex, for example  sagittal image 67, one measuring 1.7 x 0.5 cm, another measuring 0.8 x  0.6 cm, and in the area of emphysematous change, suspected to represent  nodular scarring, possibility of a pulmonary nodule not excluded, PET CT  correlation advised, interval follow-up can characterize change. Old  granulomatous disease is seen.     Upper abdominal structures no acute findings. Relative low-density of  the liver suggests steatosis.     Degenerative changes are seen in the spine. No acute fracture is  identified.       Impression:         Right apical pleural parenchymal changes suspected to represent nodular  scarring, possibility of pulmonary nodule/neoplasm not excluded, further  evaluation/follow-up recommended as indicated.     This report was finalized on 9/11/2022 2:06 PM by Dr. Dank Simmons M.D.       XR Chest 2 View [[id number]] Collected: 09/11/22 1212     Updated: 09/11/22 1221     Narrative:      STAT PA AND LATERAL RADIOGRAPHIC VIEWS OF THE CHEST     CLINICAL HISTORY: Neck swelling.     FINDINGS:     PA and lateral radiographic views of the chest were obtained. No  previous similar studies are available for comparison. There is vague  increased density within the right lung apex which may simply represent  pleural and parenchymal scarring. However, I cannot exclude the  possibility of a pneumonia or mass at this site. Further evaluation with  a CT scan of the chest is recommended. The cardiomediastinal silhouette  is within normal limits. The osseous structures are incidentally notable  for degenerative phenomena within the thoracic spine.       Impression:         There is increased density within the right lung apex which may simply  represent pleural and parenchymal scarring. However, I cannot exclude  the possibility of pneumonia or a mass at this site. I recommend further  evaluation with a CT scan of the chest.     These findings and recommendations were discussed with Dr. Hoskins on  09/11/2022 at approximately 12:09 PM.     This report was finalized on 9/11/2022 12:18 PM by Dr. Joseph Coleman M.D.           Results for orders placed during the hospital encounter of 01/08/21    Doppler Ankle Brachial Index Single Level CAR    Interpretation Summary  · Right Conclusion: The right RICHARD is normal. Normal digital pressures.  · Left Conclusion: The left RICHARD is normal. Normal digital pressures.  · No significant change when compared to the previous exam.      Pertinent Labs     Results from last 7 days   Lab Units 09/12/22  0649 09/11/22  1147   WBC 10*3/mm3 6.18 5.57   HEMOGLOBIN g/dL 14.3 15.0   PLATELETS 10*3/mm3 144 163     Results from last 7 days   Lab Units 09/12/22  0649 09/11/22  1147   SODIUM mmol/L 138 137   POTASSIUM mmol/L 4.5 4.6   CHLORIDE mmol/L 105 102   CO2 mmol/L 26.0 27.6   BUN mg/dL 10 11   CREATININE mg/dL 1.00 1.08   GLUCOSE mg/dL 120* 96   EGFR mL/min/1.73 84.6 77.1      Results from last 7 days   Lab Units 09/11/22  1147   ALBUMIN g/dL 4.40   BILIRUBIN mg/dL 0.5   ALK PHOS U/L 127*   AST (SGOT) U/L 25   ALT (SGPT) U/L 35     Results from last 7 days   Lab Units 09/12/22  0649 09/11/22  1147   CALCIUM mg/dL 8.8 9.4   ALBUMIN g/dL  --  4.40               Invalid input(s): LDLCALC          Test Results Pending at Discharge     Pending Labs     Order Current Status    Anaerobic Culture - Aspirate, Neck In process    Tissue / Bone Culture - Tissue, Neck Preliminary result          Discharge Details        Discharge Medications      New Medications      Instructions Start Date   amoxicillin-clavulanate 875-125 MG per tablet  Commonly known as: Augmentin   1 tablet, Oral, 2 Times Daily         Continue These Medications      Instructions Start Date   Magnesium 250 MG tablet   Oral, Nightly             Allergies   Allergen Reactions   • Codeine Nausea And Vomiting   • Fentanyl Nausea And Vomiting and GI Intolerance     Other reaction(s): GI Intolerance  When used patch       Discharge Disposition:  Home or Self Care      Discharge Diet:  No active diet order      Discharge Activity:   Activity Instructions     Activity as Tolerated            CODE STATUS:    Code Status and Medical Interventions:   Ordered at: 09/11/22 1634     Code Status (Patient has no pulse and is not breathing):    CPR (Attempt to Resuscitate)     Medical Interventions (Patient has pulse or is breathing):    Full Support       No future appointments.   Follow-up Information     Nita Gallegos MD Follow up in 2 week(s).    Specialty: Family Medicine  Why: Abnormal Chest CT findings- recommend repeat CT scan in 6mo (around 3/12/22) to ensure resolution  Contact information:  [address] [facility] [address]  [place] [address] [phone]             Rambo Muñiz MD Follow up in 2 week(s).    Specialty: Medical Oncology  Why: for f/u bx results  Contact information:  [address]  [place]  [id number]  [phone]                         Time Spent on Discharge:  Greater than 30 minutes      Tony Reid MD  [facility]  09/13/22  14:12 EDT

## 2022-09-13 ENCOUNTER — TRANSITIONAL CARE MANAGEMENT TELEPHONE ENCOUNTER (OUTPATIENT)
Dept: CALL CENTER | Facility: HOSPITAL | Age: 62
End: 2022-09-13

## 2022-09-13 RX ORDER — AMOXICILLIN AND CLAVULANATE POTASSIUM 875; 125 MG/1; MG/1
1 TABLET, FILM COATED ORAL 2 TIMES DAILY
Qty: 20 TABLET | Refills: 0 | Status: SHIPPED | OUTPATIENT
Start: 2022-09-13 | End: 2022-09-23

## 2022-09-13 NOTE — OUTREACH NOTE
Call Center TCM Note    Flowsheet Row Responses   [facility] patient discharged from? [place]   Does the patient have one of the following disease processes/diagnoses(primary or secondary)? Other   TCM attempt successful? No   Unsuccessful attempts Attempt 2          Jackelin Olsen MA    9/13/2022, 16:20 EDT

## 2022-09-13 NOTE — PAYOR COMM NOTE
"Srinivasa Cadena (62 y.o. Male)     DC SUMMARY FOR [id number]    CONTACT MORGAN CONNOR  P# [phone]  F# [phone]              Date of Birth   1960    Social Security Number       Address   [address] [place] [address]    Home Phone   [phone]    MRN   [id number]       Sabianist   Unknown    Marital Status                               Admission Date   9/11/22    Admission Type   Emergency    Admitting Provider   Gino Grady MD    Attending Provider       Department, Room/Bed   [facility] [address] [place], P496/1       Discharge Date   9/12/2022    Discharge Disposition   Home or Self Care    Discharge Destination                               Attending Provider: (none)   Allergies: Codeine, Fentanyl    Isolation: None   Infection: None   Code Status: Prior   Advance Care Planning Activity    Ht: 177.8 cm (70\")   Wt: 83.9 kg (185 lb)    Admission Cmt: None   Principal Problem: Cervical lymphadenopathy [R59.0]                 Active Insurance as of 9/11/2022     Primary Coverage     Payor Plan Insurance Group Employer/Plan Group    [facility] [id number]     Payor Plan Address Payor Plan Phone Number Payor Plan Fax Number Effective Dates    [address] [address] [phone]  1/1/2019 - None Entered    [facility] [id number]       Subscriber Name Subscriber Birth Date Member ID       LINELYSE 10/30/1931 [id number]           Secondary Coverage     Payor Plan Insurance Group Employer/Plan Group    MEDICARE MEDICARE A & B      Payor Plan Address Payor Plan Phone Number Payor Plan Fax Number Effective Dates    [address] [phone]  2/1/2019 - None Entered    [facility] [id number]       Subscriber Name Subscriber Birth Date Member ID       SRINIVASA CADENA 1960 [id number]                 Emergency Contacts      (Rel.) Home Phone Work Phone Mobile Phone    trina cadena (Spouse) [phone] -- --               Discharge Summary      Tony Reid MD at " 22 1412              Patient Name: Srinivasa Paz  : 1960  MRN: [id number]    Date of Admission: 2022  Date of Discharge:  2022  Primary Care Physician: Nita Gallegos MD      Chief Complaint:   Neck Swelling      Discharge Diagnoses     Active Hospital Problems    Diagnosis  POA   • **Cervical lymphadenopathy [R59.0]  Yes   • Dental caries [K02.9]  Yes   • Abnormal findings on diagnostic imaging of lung [R91.8]  Yes   • History of non-Hodgkin's lymphoma [Z85.72]  Not Applicable   • Hx antineoplastic chemotherapy [Z92.21]  Not Applicable      Resolved Hospital Problems   No resolved problems to display.        Brief Admitting HPI     Mr. Paz is a  62 y.o. with a history of lymphoma seen by Dr. Rambo Muñiz with [facility], not currently on any treatment.  He presents with 2-day history of increased swelling of his left neck.  It has progressed and became more painful today.  He denies any fever or chills.  He went to see outpatient provider today and then was referred to the emergency room.  In the ER he had CT scan as well as was given IV antibiotics.  Of note he has had some poor dentition and does have dental caries on his left lower teeth.    Hospital Course     Pt admitted for 2-day increased swelling of his left neck.  Patient underwent IR guided biopsy.  Patient was originally started on antibiotics in the emergency department, however we will not discharge him with any as he is afebrile, without a white count, and no drainable abscess seen during biopsy.  Discussed with patient to return to the ED if worsening fever, chills, increased swelling of his neck, or any respiratory compromise.  Plan to follow-up with his regular oncologist Dr. Rambo Muñiz with [facility].  Biopsy results pending at time of discharge     Discharge Plan     L neck bx  -f/u with Dr Rambo Muñiz regarding bx results    Abnormal CT  -CT Chest/Neck ():  nonspecific soft tissue density within the  right lung apex which is statistically likely representative of pleural and parenchymal scarring. However, no previous similar studies are available forcomparison to ensure stability. A follow-up chest CT could be performedin an approximate 6 month interval to ensure stability. (around 3/12/22)      Day of Discharge     Subjective:  Ready for discharge.    Physical Exam:  Temp:  [96.9 °F (36.1 °C)-98 °F (36.7 °C)] 97.7 °F (36.5 °C)  Heart Rate:  [50-67] 64  Resp:  [16-18] 18  BP: ()/(53-86) 108/64  Body mass index is 26.54 kg/m².  Physical Exam  Constitutional:       Appearance: Normal appearance.   HENT:      Head: Normocephalic and atraumatic.      Mouth/Throat:      Mouth: Mucous membranes are moist.      Pharynx: No oropharyngeal exudate or posterior oropharyngeal erythema.   Eyes:      Extraocular Movements: Extraocular movements intact.      Pupils: Pupils are equal, round, and reactive to light.   Neck:      Comments: bx site covered with dressing  Cardiovascular:      Rate and Rhythm: Normal rate and regular rhythm.      Heart sounds: No murmur heard.    No gallop.   Pulmonary:      Effort: Pulmonary effort is normal. No respiratory distress.      Breath sounds: Normal breath sounds. No wheezing.   Abdominal:      General: Abdomen is flat. There is no distension.      Palpations: Abdomen is soft.      Tenderness: There is no abdominal tenderness. There is no guarding.   Musculoskeletal:         General: No swelling, tenderness or deformity. Normal range of motion.   Skin:     General: Skin is warm.      Coloration: Skin is not jaundiced.      Findings: No bruising.   Neurological:      General: No focal deficit present.      Mental Status: He is alert and oriented to person, place, and time.      Cranial Nerves: No cranial nerve deficit.   Psychiatric:         Mood and Affect: Mood normal.         Behavior: Behavior normal.         Consultants     Consult Orders (all) (From admission, onward)     Start      Ordered    09/11/22 1359  [facility] (on-call MD unless specified) Details  Once        Specialty:  Hospitalist  Provider:  (Not yet assigned)    09/11/22 1358              Procedures     * Surgery not found *      Imaging Results (All)     Procedure Component Value Units Date/Time    US Guided Tissue Biopsy [[id number]] Resulted: 09/12/22 1149     Updated: 09/12/22 1204    CT Soft Tissue Neck With Contrast [[id number]] Collected: 09/11/22 1452     Updated: 09/11/22 1501    Narrative:      CT SCAN OF THE SOFT TISSUES OF THE NECK WAS OBTAINED WITH 3 MM AXIAL  IMAGES FOLLOWING ADMINISTRATION OF IV CONTRAST     CLINICAL HISTORY: History of non-Hodgkin lymphoma. Left neck swelling.     TECHNIQUE: CT scan of the soft tissues of the neck was obtained with 3  mm axial images following administration of IV contrast. Sagittal and  coronal reconstructed images were obtained.     FINDINGS:     There is a relatively low-attenuation soft tissue mass along the  anterior aspect of the left sternocleidomastoid muscle and along the  posterior aspect of the left submandibular gland which measures up to  approximately 2.3 x 2.2 cm in greatest axial dimensions. There is  injection of the adjacent fat. Additionally, there is a higher density  abnormality seen just inferior to this abnormality which is likely  representative of a pathologically enlarged left level 2A node measuring  up to approximately 1.3 x 1.8 cm in greatest axial dimensions.     Otherwise, the visualized contents of the cranial vault are  unremarkable. The orbits are within normal limits. The parotid glands,  submandibular glands, and sublingual glands are unremarkable. The  nasopharynx, oropharynx, and hypopharynx are unremarkable. The oral  cavity is within normal limits. The glottis and subglottic airway are  unremarkable. The trachea is within normal limits. The cervical  esophagus and visualized upper thoracic esophagus are unremarkable. The  thyroid gland is  unremarkable. The visualized lung apices are remarkable  for bullous changes within the right lung apex. Additionally, there is  nonspecific soft tissue density within the right lung apex which is  statistically likely representative of pleural and parenchymal scarring.  However, no previous similar studies are available for comparison to  ensure stability. A follow-up chest CT could be performed in an  approximate 6 month interval to ensure stability.       Impression:         There is a relatively low-attenuation mass along the anterior margins of  the left sternocleidomastoid muscle and along the posterior aspect of  the left submandibular gland measuring up to 2.3 x 2.2 cm in greatest  axial dimensions. Additionally, there is another higher density  abnormality seen just inferior to this aforementioned abnormality  measuring up to 1.3 x 1.8 cm in greatest axial dimensions. The  inferiorly situated abnormality is likely representative of an  abnormally enlarged left level 2A node. The more superiorly situated  abnormality could also be a pathologically enlarged lymph node with  cystic change or necrosis. However, another potential possibility is a  type II branchial cleft cyst. The findings could potentially represent  an infected branchial cleft cyst with adjacent inflammatory change and  lymphadenopathy. However, this process should be considered neoplastic  until proven otherwise. Further evaluation with tissue sampling is  suggested.     There is nonspecific soft tissue density within the right lung apex  which is statistically likely representative of pleural and parenchymal  scarring. However, no previous similar studies are available for  comparison to ensure stability. A follow-up chest CT could be performed  in an approximate 6 month interval to ensure stability.     These findings and recommendations were discussed with Dr. Hoskins on  09/11/2022 at approximately 1:53 PM.     Radiation dose reduction  techniques were utilized, including automated  exposure control and exposure modulation based on body size.     This report was finalized on 9/11/2022 2:58 PM by Dr. Joseph Coleman M.D.       CT Chest With Contrast Diagnostic [[id number]] Collected: 09/11/22 1401     Updated: 09/11/22 1409    Narrative:      CT CHEST W CONTRAST DIAGNOSTIC-     INDICATIONS: Increased density at the right lung apex     TECHNIQUE: Radiation dose reduction techniques were utilized, including  automated exposure control and exposure modulation based on body size.  ENHANCED CHEST CT     COMPARISON: Correlated with chest x-ray from same date     FINDINGS:           The heart size is normal without pericardial effusion. Small pericardial  recess fluid is present. A few small subcentimeter short axis  mediastinal lymph nodes are seen that are not significant by size  criteria.     The airways appear clear.     No pleural effusion or pneumothorax.     The lungs show pleural-based densities at the right apex, for example  sagittal image 67, one measuring 1.7 x 0.5 cm, another measuring 0.8 x  0.6 cm, and in the area of emphysematous change, suspected to represent  nodular scarring, possibility of a pulmonary nodule not excluded, PET CT  correlation advised, interval follow-up can characterize change. Old  granulomatous disease is seen.     Upper abdominal structures no acute findings. Relative low-density of  the liver suggests steatosis.     Degenerative changes are seen in the spine. No acute fracture is  identified.       Impression:         Right apical pleural parenchymal changes suspected to represent nodular  scarring, possibility of pulmonary nodule/neoplasm not excluded, further  evaluation/follow-up recommended as indicated.     This report was finalized on 9/11/2022 2:06 PM by Dr. Dank Simmons M.D.       XR Chest 2 View [[id number]] Collected: 09/11/22 1212     Updated: 09/11/22 1221    Narrative:      STAT PA AND LATERAL  RADIOGRAPHIC VIEWS OF THE CHEST     CLINICAL HISTORY: Neck swelling.     FINDINGS:     PA and lateral radiographic views of the chest were obtained. No  previous similar studies are available for comparison. There is vague  increased density within the right lung apex which may simply represent  pleural and parenchymal scarring. However, I cannot exclude the  possibility of a pneumonia or mass at this site. Further evaluation with  a CT scan of the chest is recommended. The cardiomediastinal silhouette  is within normal limits. The osseous structures are incidentally notable  for degenerative phenomena within the thoracic spine.       Impression:         There is increased density within the right lung apex which may simply  represent pleural and parenchymal scarring. However, I cannot exclude  the possibility of pneumonia or a mass at this site. I recommend further  evaluation with a CT scan of the chest.     These findings and recommendations were discussed with Dr. Hoskins on  09/11/2022 at approximately 12:09 PM.     This report was finalized on 9/11/2022 12:18 PM by Dr. Joseph Coleman M.D.           Results for orders placed during the hospital encounter of 01/08/21    Doppler Ankle Brachial Index Single Level CAR    Interpretation Summary  · Right Conclusion: The right RICHARD is normal. Normal digital pressures.  · Left Conclusion: The left RICHARD is normal. Normal digital pressures.  · No significant change when compared to the previous exam.      Pertinent Labs     Results from last 7 days   Lab Units 09/12/22  0649 09/11/22  1147   WBC 10*3/mm3 6.18 5.57   HEMOGLOBIN g/dL 14.3 15.0   PLATELETS 10*3/mm3 144 163     Results from last 7 days   Lab Units 09/12/22  0649 09/11/22  1147   SODIUM mmol/L 138 137   POTASSIUM mmol/L 4.5 4.6   CHLORIDE mmol/L 105 102   CO2 mmol/L 26.0 27.6   BUN mg/dL 10 11   CREATININE mg/dL 1.00 1.08   GLUCOSE mg/dL 120* 96   EGFR mL/min/1.73 84.6 77.1     Results from last 7 days   Lab Units  09/11/22  1147   ALBUMIN g/dL 4.40   BILIRUBIN mg/dL 0.5   ALK PHOS U/L 127*   AST (SGOT) U/L 25   ALT (SGPT) U/L 35     Results from last 7 days   Lab Units 09/12/22  0649 09/11/22  1147   CALCIUM mg/dL 8.8 9.4   ALBUMIN g/dL  --  4.40               Invalid input(s): LDLCALC          Test Results Pending at Discharge     Pending Labs     Order Current Status    Flow Cytometry Collected (09/12/22 1140)    Non-gynecologic Cytology Collected (09/12/22 1140)    Anaerobic Culture - Aspirate, Neck In process    Tissue / Bone Culture - Tissue, Neck In process    Tissue Pathology Exam In process          Discharge Details        Discharge Medications      Continue These Medications      Instructions Start Date   Magnesium 250 MG tablet   Oral, Nightly             Allergies   Allergen Reactions   • Codeine Nausea And Vomiting   • Fentanyl Nausea And Vomiting and GI Intolerance     Other reaction(s): GI Intolerance  When used patch       Discharge Disposition:  Home or Self Care      Discharge Diet:  Diet Order   Procedures   • NPO Diet NPO Type: Sips with Meds       Discharge Activity:   Activity Instructions     Activity as Tolerated            CODE STATUS:    Code Status and Medical Interventions:   Ordered at: 09/11/22 1634     Code Status (Patient has no pulse and is not breathing):    CPR (Attempt to Resuscitate)     Medical Interventions (Patient has pulse or is breathing):    Full Support       No future appointments.   Follow-up Information     Nita Gallegos MD Follow up in 2 week(s).    Specialty: Family Medicine  Contact information:  [address]  [address]  [facility] [id number] [phone]             Rambo Muñiz MD Follow up in 2 week(s).    Specialty: Medical Oncology  Why: for f/u bx results  Contact information:  [address]  [facility] [id number] [phone]                         Time Spent on Discharge:  Greater than 30 minutes      Tony Reid MD  [facility]  09/12/22  14:12 EDT                Electronically signed by Tony Reid MD at 09/12/22 6548

## 2022-09-13 NOTE — OUTREACH NOTE
Call Center TCM Note    Flowsheet Row Responses   [facility] patient discharged from? [place]   Does the patient have one of the following disease processes/diagnoses(primary or secondary)? Other   TCM attempt successful? No   Unsuccessful attempts Attempt 1          Jackelin Olsen MA    9/13/2022, 15:08 EDT

## 2022-09-14 ENCOUNTER — TRANSITIONAL CARE MANAGEMENT TELEPHONE ENCOUNTER (OUTPATIENT)
Dept: CALL CENTER | Facility: HOSPITAL | Age: 62
End: 2022-09-14

## 2022-09-14 LAB
LAB AP CASE REPORT: NORMAL
LAB AP DIAGNOSIS COMMENT: NORMAL
LAB AP SPECIAL STAINS: NORMAL
PATH REPORT.ADDENDUM SPEC: NORMAL
PATH REPORT.FINAL DX SPEC: NORMAL
PATH REPORT.GROSS SPEC: NORMAL

## 2022-09-14 NOTE — OUTREACH NOTE
Call Center TCM Note    Flowsheet Row Responses   [facility] patient discharged from? [place]   Does the patient have one of the following disease processes/diagnoses(primary or secondary)? Other   TCM attempt successful? Yes   Call start time 1552   Call end time 1554   Discharge diagnosis Cervical lymphadenopathy   Meds reviewed with patient/caregiver? Yes   Is the patient having any side effects they believe may be caused by any medication additions or changes? No   Does the patient have all medications ordered at discharge? Yes   Prescription comments AUGMENTIN WAS SENT TO THE PHARMACY YESTERDAY AND PATIENT PICKED IT UP   Is the patient taking all medications as directed (includes completed medication regime)? Yes   Has home health visited the patient within 72 hours of discharge? N/A   Psychosocial issues? No   Did the patient receive a copy of their discharge instructions? Yes   Nursing interventions Reviewed instructions with patient   What is the patient's perception of their health status since discharge? Improving   Is the patient/caregiver able to teach back signs and symptoms related to disease process for when to call PCP? Yes   Is the patient/caregiver able to teach back signs and symptoms related to disease process for when to call 911? Yes   Is the patient/caregiver able to teach back the hierarchy of who to call/visit for symptoms/problems? PCP, Specialist, Home health nurse, Urgent Care, ED, 911 Yes   If the patient is a current smoker, are they able to teach back resources for cessation? Not a smoker   TCM call completed? Yes          Chanell Clifton LPN    9/14/2022, 15:58 EDT

## 2022-09-15 LAB
BACTERIA SPEC AEROBE CULT: NORMAL
GRAM STN SPEC: NORMAL

## 2022-09-17 LAB — BACTERIA SPEC ANAEROBE CULT: ABNORMAL

## 2022-09-23 ENCOUNTER — OFFICE VISIT (OUTPATIENT)
Dept: FAMILY MEDICINE CLINIC | Facility: CLINIC | Age: 62
End: 2022-09-23

## 2022-09-23 VITALS
DIASTOLIC BLOOD PRESSURE: 80 MMHG | OXYGEN SATURATION: 100 % | HEIGHT: 70 IN | TEMPERATURE: 93.7 F | HEART RATE: 61 BPM | SYSTOLIC BLOOD PRESSURE: 139 MMHG | WEIGHT: 184 LBS | BODY MASS INDEX: 26.34 KG/M2

## 2022-09-23 DIAGNOSIS — R22.1 NECK MASS: ICD-10-CM

## 2022-09-23 DIAGNOSIS — R93.89 ABNORMAL CT OF THE CHEST: ICD-10-CM

## 2022-09-23 DIAGNOSIS — R93.89 ABNORMAL CT SCAN, NECK: ICD-10-CM

## 2022-09-23 DIAGNOSIS — Z09 HOSPITAL DISCHARGE FOLLOW-UP: Primary | ICD-10-CM

## 2022-09-23 PROCEDURE — 99495 TRANSJ CARE MGMT MOD F2F 14D: CPT | Performed by: FAMILY MEDICINE

## 2022-09-23 PROCEDURE — 1111F DSCHRG MED/CURRENT MED MERGE: CPT | Performed by: FAMILY MEDICINE

## 2022-09-23 NOTE — PROGRESS NOTES
Mary Jo Paz is a 62 y.o. male.     Chief Complaint   Patient presents with   • Hospital Follow Up Visit       History of Present Illness   Hospital follow-up.  Hospitalized [facility].  Date of admission 2022.  Date of discharge 2022.  Final discharge diagnosis: Cervical lymphadenopathy.  Patient was found to have a mass on the left side of his neck and abscess was drained.  He was discharged on antibiotics.      The following portions of the patient's history were reviewed and updated as appropriate: allergies, current medications, past family history, past medical history, past social history, past surgical history and problem list.    Past Medical History:   Diagnosis Date   • Abdominal pain, LLQ (left lower quadrant)    • Adverse reaction to drug    • Anxiety    • Bone pain    • Chest pain, midsternal    • Cough    • Current every day smoker    • Depression    • Encounter for smoking cessation counseling    • Hematuria    • High risk medication use    • Hyperglycemia    • Hyperlipidemia    • Irritability and anger    • Lymphoma (HCC)    • Proteinuria    • Sciatica    • Skin benign neoplasm    • Visual changes        Past Surgical History:   Procedure Laterality Date   • KNEE SURGERY Left    • KNEE SURGERY     • LYMPH NODE BIOPSY         Family History   Problem Relation Age of Onset   • Heart attack Father    • No Known Problems Other        Social History     Socioeconomic History   • Marital status:    Tobacco Use   • Smoking status: Former Smoker     Quit date: 9/15/2020     Years since quittin.0   • Smokeless tobacco: Never Used   Substance and Sexual Activity   • Alcohol use: Not Currently     Comment: sober for 20 yrs   • Drug use: No   • Sexual activity: Yes       Current Outpatient Medications on File Prior to Visit   Medication Sig Dispense Refill   • amoxicillin-clavulanate (Augmentin) 875-125 MG per tablet Take 1 tablet by mouth 2 (Two) Times a Day for 10  days. 20 tablet 0   • Magnesium 250 MG tablet Take  by mouth Every Night.     • [DISCONTINUED] buPROPion XL (Wellbutrin XL) 150 MG 24 hr tablet Take 1 tablet by mouth Daily. 30 tablet 11     No current facility-administered medications on file prior to visit.       Review of Systems   HENT:        Neck mass       Recent Results (from the past 4704 hour(s))   Lipid Panel With LDL / HDL Ratio    Collection Time: 03/25/22 11:55 AM    Specimen: Blood   Result Value Ref Range    Total Cholesterol 228 (H) 100 - 199 mg/dL    Triglycerides 115 0 - 149 mg/dL    HDL Cholesterol 48 >39 mg/dL    VLDL Cholesterol Jone 21 5 - 40 mg/dL    LDL Chol Calc (NIH) 159 (H) 0 - 99 mg/dL    LDL/HDL RATIO 3.3 0.0 - 3.6 ratio   Hemoglobin A1c    Collection Time: 03/25/22 11:55 AM    Specimen: Blood   Result Value Ref Range    Hemoglobin A1C 5.8 (H) 4.8 - 5.6 %   LACTATE DEHYDROGENASE    Collection Time: 08/15/22  2:45 PM    Specimen: Fresh Frozen Plasma    Specimen type and source: Plasma, Blood   Result Value Ref Range     125 - 220 U/L   Comprehensive Metabolic Panel    Collection Time: 09/11/22 11:47 AM    Specimen: Blood   Result Value Ref Range    Glucose 96 65 - 99 mg/dL    BUN 11 8 - 23 mg/dL    Creatinine 1.08 0.76 - 1.27 mg/dL    Sodium 137 136 - 145 mmol/L    Potassium 4.6 3.5 - 5.2 mmol/L    Chloride 102 98 - 107 mmol/L    CO2 27.6 22.0 - 29.0 mmol/L    Calcium 9.4 8.6 - 10.5 mg/dL    Total Protein 6.8 6.0 - 8.5 g/dL    Albumin 4.40 3.50 - 5.20 g/dL    ALT (SGPT) 35 1 - 41 U/L    AST (SGOT) 25 1 - 40 U/L    Alkaline Phosphatase 127 (H) 39 - 117 U/L    Total Bilirubin 0.5 0.0 - 1.2 mg/dL    Globulin 2.4 gm/dL    A/G Ratio 1.8 g/dL    BUN/Creatinine Ratio 10.2 7.0 - 25.0    Anion Gap 7.4 5.0 - 15.0 mmol/L    eGFR 77.1 >60.0 mL/min/1.73   CBC Auto Differential    Collection Time: 09/11/22 11:47 AM    Specimen: Blood   Result Value Ref Range    WBC 5.57 3.40 - 10.80 10*3/mm3    RBC 5.06 4.14 - 5.80 10*6/mm3    Hemoglobin 15.0  13.0 - 17.7 g/dL    Hematocrit 43.6 37.5 - 51.0 %    MCV 86.2 79.0 - 97.0 fL    MCH 29.6 26.6 - 33.0 pg    MCHC 34.4 31.5 - 35.7 g/dL    RDW 12.9 12.3 - 15.4 %    RDW-SD 40.7 37.0 - 54.0 fl    MPV 9.5 6.0 - 12.0 fL    Platelets 163 140 - 450 10*3/mm3    Neutrophil % 73.2 42.7 - 76.0 %    Lymphocyte % 17.1 (L) 19.6 - 45.3 %    Monocyte % 6.1 5.0 - 12.0 %    Eosinophil % 2.3 0.3 - 6.2 %    Basophil % 1.1 0.0 - 1.5 %    Immature Grans % 0.2 0.0 - 0.5 %    Neutrophils, Absolute 4.08 1.70 - 7.00 10*3/mm3    Lymphocytes, Absolute 0.95 0.70 - 3.10 10*3/mm3    Monocytes, Absolute 0.34 0.10 - 0.90 10*3/mm3    Eosinophils, Absolute 0.13 0.00 - 0.40 10*3/mm3    Basophils, Absolute 0.06 0.00 - 0.20 10*3/mm3    Immature Grans, Absolute 0.01 0.00 - 0.05 10*3/mm3    nRBC 0.0 0.0 - 0.2 /100 WBC   Lactic Acid, Plasma    Collection Time: 09/11/22 11:47 AM    Specimen: Blood   Result Value Ref Range    Lactate 1.1 0.5 - 2.0 mmol/L   Procalcitonin    Collection Time: 09/11/22 11:47 AM    Specimen: Blood   Result Value Ref Range    Procalcitonin 0.07 0.00 - 0.25 ng/mL   Basic Metabolic Panel    Collection Time: 09/12/22  6:49 AM    Specimen: Blood   Result Value Ref Range    Glucose 120 (H) 65 - 99 mg/dL    BUN 10 8 - 23 mg/dL    Creatinine 1.00 0.76 - 1.27 mg/dL    Sodium 138 136 - 145 mmol/L    Potassium 4.5 3.5 - 5.2 mmol/L    Chloride 105 98 - 107 mmol/L    CO2 26.0 22.0 - 29.0 mmol/L    Calcium 8.8 8.6 - 10.5 mg/dL    BUN/Creatinine Ratio 10.0 7.0 - 25.0    Anion Gap 7.0 5.0 - 15.0 mmol/L    eGFR 84.6 >60.0 mL/min/1.73   CBC Auto Differential    Collection Time: 09/12/22  6:49 AM    Specimen: Blood   Result Value Ref Range    WBC 6.18 3.40 - 10.80 10*3/mm3    RBC 4.80 4.14 - 5.80 10*6/mm3    Hemoglobin 14.3 13.0 - 17.7 g/dL    Hematocrit 41.5 37.5 - 51.0 %    MCV 86.5 79.0 - 97.0 fL    MCH 29.8 26.6 - 33.0 pg    MCHC 34.5 31.5 - 35.7 g/dL    RDW 12.9 12.3 - 15.4 %    RDW-SD 40.7 37.0 - 54.0 fl    MPV 9.4 6.0 - 12.0 fL    Platelets  144 140 - 450 10*3/mm3    Neutrophil % 74.0 42.7 - 76.0 %    Lymphocyte % 14.7 (L) 19.6 - 45.3 %    Monocyte % 7.4 5.0 - 12.0 %    Eosinophil % 2.6 0.3 - 6.2 %    Basophil % 1.0 0.0 - 1.5 %    Immature Grans % 0.3 0.0 - 0.5 %    Neutrophils, Absolute 4.57 1.70 - 7.00 10*3/mm3    Lymphocytes, Absolute 0.91 0.70 - 3.10 10*3/mm3    Monocytes, Absolute 0.46 0.10 - 0.90 10*3/mm3    Eosinophils, Absolute 0.16 0.00 - 0.40 10*3/mm3    Basophils, Absolute 0.06 0.00 - 0.20 10*3/mm3    Immature Grans, Absolute 0.02 0.00 - 0.05 10*3/mm3    nRBC 0.0 0.0 - 0.2 /100 WBC   Protime-INR    Collection Time: 09/12/22  6:49 AM    Specimen: Blood   Result Value Ref Range    Protime 13.2 11.7 - 14.2 Seconds    INR 1.01 0.90 - 1.10   Tissue Pathology Exam    Collection Time: 09/12/22 11:40 AM    Specimen: Neck; Aspirate   Result Value Ref Range    Addendum       Additional tissue is received in RPMI labeled with the patient's name and designated 'left neck mass' consisting of a grey-white core fragment of soft tissue measuring 1.2 x 0.1 cm in tubal diameter. The specimen is held for possible flow cytometry.  After initial histologic evaluation the specimen held for flow cytometry is submitted as 1C for permanent section.  The overall diagnosis remains unchanged.      mb/uso/clm      Case Report       Surgical Pathology Report                         Case: [id number]                                  Authorizing Provider:  Gino Grady MD    Collected:           09/12/2022 11:40 AM          Ordering Location:     [facility]  Received:            09/12/2022 12:38 PM                                 [address]                                                                       Pathologist:           Radha Dee MD                                                    Specimen:    Neck                                                                                       Final Diagnosis       1. Neck, Core Biopsies:  "   A. Abscess.    domingo/jse       Comment       This case is reviewed internally with Dr. Larsen, who concurs.  Recommend correlation with microbial studies as clinically indicated.    domingo/antoni       Gross Description       1. Neck.   Received in formalin labeled with the patient's name and designated \"neck needle biopsy\" is a single gray white core fragment of soft tissue measuring 0.9 x 0.1 cm in tubal diameter.  The tissue is submitted in 1A.    mb/o/jasandro/jse     Additional tissue was received in saline from micro consisting of a grey-white irregular soft tissue fragment measuring 0.4 x  0.2 x 0.1 cm. The tissue is submitted entirely in 1B.  mb/o/clm        Special Stains       Utilizing appropriate controls, a GMS fungal stain is performed on block 1A and is negative for fungal organisms.     domingo/antoni      Anaerobic Culture - Aspirate, Neck    Collection Time: 09/12/22 11:40 AM    Specimen: Neck; Aspirate   Result Value Ref Range    Anaerobic Culture Propionibacterium species (A)    Tissue / Bone Culture - Tissue, Neck    Collection Time: 09/12/22 11:40 AM    Specimen: Neck; Tissue   Result Value Ref Range    Tissue Culture No growth at 3 days     Gram Stain No WBCs or organisms seen      Objective   Vitals:    09/23/22 1057   BP: 139/80   BP Location: Right arm   Patient Position: Sitting   Pulse: 61   Temp: 93.7 °F (34.3 °C)   SpO2: 100%   Weight: 83.5 kg (184 lb)   Height: 177.8 cm (70\")     Body mass index is 26.4 kg/m².  Physical Exam  Vitals and nursing note reviewed.   Constitutional:       General: He is not in acute distress.     Appearance: He is well-developed. He is not diaphoretic.   HENT:      Head:      Comments: Patient continues to have tenderness and bulky mass on the left side of his neck which could appears parotid gland inflammation  Cardiovascular:      Rate and Rhythm: Normal rate and regular rhythm.   Pulmonary:      Effort: Pulmonary effort is normal. No respiratory distress.      Breath " sounds: Normal breath sounds. No wheezing.           Diagnoses and all orders for this visit:    1. Hospital discharge follow-up (Primary)    2. Abnormal CT of the chest  -     CT Chest With Contrast; Future    3. Neck mass  -     Ambulatory Referral to Oral Maxillofacial Surgery    4. Abnormal CT scan, neck  -     Ambulatory Referral to Oral Maxillofacial Surgery    All the records from hospitalization were reviewed and discussed with patient.  he was also instructed to see his oncologist as soon as possible.

## 2022-09-29 DIAGNOSIS — R93.89 ABNORMAL CT SCAN, NECK: ICD-10-CM

## 2022-09-29 DIAGNOSIS — R22.1 NECK MASS: Primary | ICD-10-CM

## 2023-02-27 DIAGNOSIS — R91.8 ABNORMAL FINDINGS ON DIAGNOSTIC IMAGING OF LUNG: Primary | ICD-10-CM

## 2023-06-22 PROBLEM — R22.1 NECK MASS: Status: RESOLVED | Noted: 2022-09-23 | Resolved: 2023-06-22

## 2023-06-22 PROBLEM — R59.0 CERVICAL LYMPHADENOPATHY: Status: RESOLVED | Noted: 2022-09-11 | Resolved: 2023-06-22

## 2023-06-22 PROBLEM — R93.89 ABNORMAL CT SCAN, NECK: Status: RESOLVED | Noted: 2022-09-23 | Resolved: 2023-06-22

## 2024-01-03 ENCOUNTER — OFFICE VISIT (OUTPATIENT)
Dept: FAMILY MEDICINE CLINIC | Facility: CLINIC | Age: 64
End: 2024-01-03
Payer: MEDICARE

## 2024-01-03 VITALS
WEIGHT: 176.8 LBS | HEIGHT: 69 IN | OXYGEN SATURATION: 98 % | BODY MASS INDEX: 26.19 KG/M2 | SYSTOLIC BLOOD PRESSURE: 120 MMHG | DIASTOLIC BLOOD PRESSURE: 74 MMHG | HEART RATE: 67 BPM

## 2024-01-03 DIAGNOSIS — F51.01 PRIMARY INSOMNIA: ICD-10-CM

## 2024-01-03 DIAGNOSIS — M79.671 PAIN IN BOTH FEET: ICD-10-CM

## 2024-01-03 DIAGNOSIS — F33.2 SEVERE EPISODE OF RECURRENT MAJOR DEPRESSIVE DISORDER, WITHOUT PSYCHOTIC FEATURES: ICD-10-CM

## 2024-01-03 DIAGNOSIS — C82.90 FOLLICULAR LYMPHOMA, UNSPECIFIED FOLLICULAR LYMPHOMA TYPE, UNSPECIFIED BODY REGION: ICD-10-CM

## 2024-01-03 DIAGNOSIS — F41.9 ANXIETY: ICD-10-CM

## 2024-01-03 DIAGNOSIS — R73.9 HYPERGLYCEMIA: Primary | ICD-10-CM

## 2024-01-03 DIAGNOSIS — M79.672 PAIN IN BOTH FEET: ICD-10-CM

## 2024-01-03 DIAGNOSIS — E78.5 HYPERLIPIDEMIA, UNSPECIFIED HYPERLIPIDEMIA TYPE: ICD-10-CM

## 2024-01-03 DIAGNOSIS — Z87.891 FORMER SMOKER: ICD-10-CM

## 2024-01-03 PROBLEM — C83.30 DLBCL (DIFFUSE LARGE B CELL LYMPHOMA): Status: ACTIVE | Noted: 2017-05-16

## 2024-01-03 PROBLEM — R10.32 LEFT LOWER QUADRANT PAIN: Status: ACTIVE | Noted: 2018-08-17

## 2024-01-03 PROBLEM — E87.5 HYPERKALEMIA: Status: ACTIVE | Noted: 2019-05-24

## 2024-01-03 PROCEDURE — 1159F MED LIST DOCD IN RCRD: CPT | Performed by: STUDENT IN AN ORGANIZED HEALTH CARE EDUCATION/TRAINING PROGRAM

## 2024-01-03 PROCEDURE — 99214 OFFICE O/P EST MOD 30 MIN: CPT | Performed by: STUDENT IN AN ORGANIZED HEALTH CARE EDUCATION/TRAINING PROGRAM

## 2024-01-03 PROCEDURE — 1160F RVW MEDS BY RX/DR IN RCRD: CPT | Performed by: STUDENT IN AN ORGANIZED HEALTH CARE EDUCATION/TRAINING PROGRAM

## 2024-01-03 RX ORDER — TRAZODONE HYDROCHLORIDE 100 MG/1
100 TABLET ORAL NIGHTLY
Qty: 90 TABLET | Refills: 0 | Status: SHIPPED | OUTPATIENT
Start: 2024-01-03

## 2024-01-03 NOTE — PROGRESS NOTES
"Chief Complaint   Magnesium (Follow up ) and Insomnia (Unable to sleep //)    Subjective        Srinivasa Paz presents to [facility]  History of Present Illness    Here to follow up. Has not been sleeping well. Takes melatonin 20 mg at night for years. Sometimes will work well and other times does not work. If it does not work the next day will feel grouchy. Takes THC chewables as well - called relax. Melatonin works better with those. When taking the chewable will feel some light headed when he gets up.   Did see oncology for follicular lymphoma in October and has a 6 month follow up. Was given triazolam for sleep before. Not sure if it worked but did not want a habit forming drug.     Takes magnesium for hayden horses. Notes them more when he over exerts himself.     Has never slept well. Does toss and turn throughout the night. Cannot nap during the day. Wife says he snores some but does not believe he has sleep apnea. Now him and his wife dont sleep together because she snores.     Objective   Vital Signs:  /74 (BP Location: Left arm, Patient Position: Sitting, Cuff Size: Adult)   Pulse 67   Ht 175.3 cm (69\")   Wt 80.2 kg (176 lb 12.8 oz)   SpO2 98%   BMI 26.11 kg/m²   Estimated body mass index is 26.11 kg/m² as calculated from the following:    Height as of this encounter: 175.3 cm (69\").    Weight as of this encounter: 80.2 kg (176 lb 12.8 oz).               Physical Exam  Vitals and nursing note reviewed.   Constitutional:       General: He is not in acute distress.     Appearance: Normal appearance. He is not ill-appearing.   HENT:      Head: Normocephalic and atraumatic.      Nose: Nose normal.      Mouth/Throat:      Mouth: Mucous membranes are moist.   Eyes:      Extraocular Movements: Extraocular movements intact.      Conjunctiva/sclera: Conjunctivae normal.   Cardiovascular:      Rate and Rhythm: Normal rate and regular rhythm.      Heart sounds: Normal heart " sounds. No murmur heard.     No gallop.   Pulmonary:      Effort: Pulmonary effort is normal. No respiratory distress.      Breath sounds: Normal breath sounds. No stridor. No wheezing, rhonchi or rales.   Chest:      Chest wall: No tenderness.   Skin:     General: Skin is warm and dry.   Neurological:      General: No focal deficit present.      Mental Status: He is alert and oriented to person, place, and time. Mental status is at baseline.   Psychiatric:         Mood and Affect: Mood normal.         Behavior: Behavior normal.        Result Review :                   Assessment and Plan   Diagnoses and all orders for this visit:    1. Hyperglycemia (Primary)  -     Hemoglobin A1c    2. Follicular lymphoma, unspecified follicular lymphoma type, unspecified body region    3. Former smoker    4. Anxiety    5. Severe episode of recurrent major depressive disorder, without psychotic features    6. Hyperlipidemia, unspecified hyperlipidemia type  -     Lipid panel    7. Primary insomnia  -     traZODone (DESYREL) 100 MG tablet; Take 1 tablet by mouth Every Night.  Dispense: 90 tablet; Refill: 0    8. Pain in both feet  -     Ambulatory Referral to Podiatry    Will stop melatonin and do a trial of trazodone. Will plan to restart melatonin and taper up in three months. Discussed sleep hygiene and attached info to AVS.          Follow Up   Return in about 3 months (around 4/3/2024) for insomnia .  Patient was given instructions and counseling regarding his condition or for health maintenance advice. Please see specific information pulled into the AVS if appropriate.

## 2024-01-04 LAB
CHOLEST SERPL-MCNC: 249 MG/DL (ref 0–200)
HBA1C MFR BLD: 5.8 % (ref 4.8–5.6)
HDLC SERPL-MCNC: 53 MG/DL (ref 40–60)
LDLC SERPL CALC-MCNC: 182 MG/DL (ref 0–100)
TRIGL SERPL-MCNC: 80 MG/DL (ref 0–150)
VLDLC SERPL CALC-MCNC: 14 MG/DL (ref 5–40)

## 2024-01-04 RX ORDER — ATORVASTATIN CALCIUM 10 MG/1
10 TABLET, FILM COATED ORAL DAILY
Qty: 90 TABLET | Refills: 3 | Status: SHIPPED | OUTPATIENT
Start: 2024-01-04

## 2024-02-20 ENCOUNTER — TELEPHONE (OUTPATIENT)
Dept: FAMILY MEDICINE CLINIC | Facility: CLINIC | Age: 64
End: 2024-02-20

## 2024-02-20 NOTE — TELEPHONE ENCOUNTER
Caller: Srinivasa Paz    Relationship: Self    Best call back number: [phone]    What is the best time to reach you: ANYTIME     Who are you requesting to speak with (clinical staff, provider,  specific staff member): CLINICAL STAFF     Do you know the name of the person who called: SELF     What was the call regarding: PATIENT CALLED AND STATED PLEASE MAIL MY LAST LAB RESULTS     Is it okay if the provider responds through MyChart: NO

## 2024-02-21 ENCOUNTER — TELEPHONE (OUTPATIENT)
Dept: FAMILY MEDICINE CLINIC | Facility: CLINIC | Age: 64
End: 2024-02-21
Payer: COMMERCIAL

## 2024-02-21 NOTE — TELEPHONE ENCOUNTER
He denies myalgias with atorvastatin which he will continue along with a low-fat, low-cholesterol diet.   PATIENT CALLING BACK STATING THAT HE WANTS THESE LAB RESULTS MAILED, AND THAT HE DOES USE T[employer] ([employer]) AND DOES NOT WANT MESSAGES PUT ON THERE SINCE HE DOES NOT USE IT.  PLEASE MAIL LABS.     Statement Selected

## 2024-02-21 NOTE — TELEPHONE ENCOUNTER
Ok For [facility] to Relay    Dr Miranda will be out of the office on April 3, 2024 and we will need to reschedule your appointment. Please call the office at [phone].

## 2024-02-21 NOTE — TELEPHONE ENCOUNTER
Name: Srinivasa Paz    Relationship: Self    HUB PROVIDED THE RELAY MESSAGE FROM THE OFFICE   PATIENT SCHEDULED AS REQUESTED

## 2024-03-28 DIAGNOSIS — F51.01 PRIMARY INSOMNIA: ICD-10-CM

## 2024-03-28 RX ORDER — TRAZODONE HYDROCHLORIDE 100 MG/1
100 TABLET ORAL NIGHTLY
Qty: 90 TABLET | Refills: 0 | Status: SHIPPED | OUTPATIENT
Start: 2024-03-28

## 2024-03-28 NOTE — TELEPHONE ENCOUNTER
PATIENT CALLED TO REQUEST A 30 DAY REFILL OF  traZODone (DESYREL) 100 MG tablet   FOR SLEEP. HE FINISHED THE 90 DAY SUPPLY    [facility] [id number] - [place] [id number] AT [facility] [address] - [phone]  - [phone]  [phone]     CALL BACK NUMBER [phone]

## 2024-06-20 DIAGNOSIS — F51.01 PRIMARY INSOMNIA: ICD-10-CM

## 2024-06-20 RX ORDER — TRAZODONE HYDROCHLORIDE 100 MG/1
100 TABLET ORAL NIGHTLY
Qty: 90 TABLET | Refills: 0 | Status: CANCELLED | OUTPATIENT
Start: 2024-06-20

## 2024-06-20 NOTE — TELEPHONE ENCOUNTER
Caller: Srinivasa Paz    Relationship: Self    Best call back number: [phone]     Which medication are you concerned about: traZODone (DESYREL) 100 MG tablet     Who prescribed you this medication:     When did you start taking this medication: ABOUT 5 OR 6MONTHS    What are your concerns: PATIENT STATES THAT HE HAS BEEN HAVING DIZZINESS AND FEELING LIKE HE WOULD FAINT HE STATED THAT THESE ARE LISTED FOR SIDE EFFECTS.    HE WOULD LIKE TO KNOW IF THERE IS SOMETHING ELSE THAT COULD TAKE PLACE OF THE MEDICATION.    How long have you had these concerns: SINCE STARTING TAKING THE MEDICATIONS

## 2024-06-20 NOTE — TELEPHONE ENCOUNTER
Has patient tried cutting the medication in half, to see if the side effects improve? Otherwise a visit would be needed to change or amend orders, thanks.     SPOKE WITH PATIENT AND GAVE HIM PROVIDERS INSTRUCTIONS, HE SAID HE HAS NOT TRIED TO CUT IN HALF AND HE WILL DO THAT AND IF DOESN'T IMPROVE HE WAS INFORMED AN OFFICE VISIT WOULD NEED TO BE SCHEDULED, PATIENT VOICED UNDERSTANDING.

## 2024-06-30 DIAGNOSIS — F51.01 PRIMARY INSOMNIA: ICD-10-CM

## 2024-07-01 RX ORDER — TRAZODONE HYDROCHLORIDE 100 MG/1
100 TABLET ORAL NIGHTLY
Qty: 90 TABLET | Refills: 0 | Status: SHIPPED | OUTPATIENT
Start: 2024-07-01 | End: 2024-07-02

## 2024-07-02 ENCOUNTER — OFFICE VISIT (OUTPATIENT)
Dept: FAMILY MEDICINE CLINIC | Facility: CLINIC | Age: 64
End: 2024-07-02
Payer: COMMERCIAL

## 2024-07-02 VITALS
HEIGHT: 69 IN | WEIGHT: 185.6 LBS | TEMPERATURE: 97.8 F | DIASTOLIC BLOOD PRESSURE: 72 MMHG | HEART RATE: 64 BPM | BODY MASS INDEX: 27.49 KG/M2 | OXYGEN SATURATION: 99 % | SYSTOLIC BLOOD PRESSURE: 118 MMHG

## 2024-07-02 DIAGNOSIS — E78.5 HYPERLIPIDEMIA, UNSPECIFIED HYPERLIPIDEMIA TYPE: Primary | ICD-10-CM

## 2024-07-02 DIAGNOSIS — R73.9 HYPERGLYCEMIA: ICD-10-CM

## 2024-07-02 DIAGNOSIS — Z71.85 VACCINE COUNSELING: ICD-10-CM

## 2024-07-02 DIAGNOSIS — F51.01 PRIMARY INSOMNIA: ICD-10-CM

## 2024-07-02 RX ORDER — MIRTAZAPINE 7.5 MG/1
7.5 TABLET, FILM COATED ORAL NIGHTLY
Qty: 30 TABLET | Refills: 0 | Status: SHIPPED | OUTPATIENT
Start: 2024-07-02

## 2024-07-02 NOTE — PROGRESS NOTES
"Chief Complaint  Hyperlipidemia    Subjective        Srinivasa Paz presents to [facility]  History of Present Illness    Has been having episodes of lightheadedness. Feels almost like he may faint. Was not sure if it was related to his trazodone or his blood pressure being low. Does take his time when standing. Was told to cut his trazodone in half by another provider but then was not able to rest well.   Has lost a couple friends since last visit. One was due to not taking his cholesterol medication.   Has had issues with bone pain with other cholesterol medications before but has done well on livalo.   Went fishing one month ago and got into a bush with thorns and since has noticed some tingling in his left elbow.     Objective   Vital Signs:  /72 (BP Location: Left arm, Patient Position: Sitting, Cuff Size: Adult)   Pulse 64   Temp 97.8 °F (36.6 °C)   Ht 175.3 cm (69\")   Wt 84.2 kg (185 lb 9.6 oz)   SpO2 99%   BMI 27.41 kg/m²   Estimated body mass index is 27.41 kg/m² as calculated from the following:    Height as of this encounter: 175.3 cm (69\").    Weight as of this encounter: 84.2 kg (185 lb 9.6 oz).             Physical Exam  Vitals and nursing note reviewed.   Constitutional:       General: He is not in acute distress.     Appearance: Normal appearance. He is not ill-appearing.   HENT:      Head: Normocephalic and atraumatic.      Nose: Nose normal.      Mouth/Throat:      Mouth: Mucous membranes are moist.   Eyes:      Extraocular Movements: Extraocular movements intact.      Conjunctiva/sclera: Conjunctivae normal.   Cardiovascular:      Rate and Rhythm: Normal rate and regular rhythm.      Heart sounds: Normal heart sounds. No murmur heard.     No gallop.   Pulmonary:      Effort: Pulmonary effort is normal. No respiratory distress.      Breath sounds: Normal breath sounds. No stridor. No wheezing, rhonchi or rales.   Chest:      Chest wall: No tenderness. "   Skin:     General: Skin is warm and dry.   Neurological:      General: No focal deficit present.      Mental Status: He is alert and oriented to person, place, and time. Mental status is at baseline.   Psychiatric:         Mood and Affect: Mood normal.         Behavior: Behavior normal.        Result Review :                     Assessment and Plan     Diagnoses and all orders for this visit:    1. Hyperlipidemia, unspecified hyperlipidemia type (Primary)  -     Pitavastatin Magnesium 4 MG tablet; Take 4 mg by mouth Daily.  Dispense: 90 tablet; Refill: 3    2. Primary insomnia  -     mirtazapine (REMERON) 7.5 MG tablet; Take 1 tablet by mouth Every Night.  Dispense: 30 tablet; Refill: 0    3. Vaccine counseling  -     Pneumococcal Conjugate Vaccine 20-Valent (PCV20)    4. Hyperglycemia  -     Hemoglobin A1c      Will change trazodone for mirtazepine.   Orthostats requested but patient left before completing. He was called to return.        Follow Up     Return in about 3 months (around 10/2/2024) for Medicare Wellness.  Patient was given instructions and counseling regarding his condition or for health maintenance advice. Please see specific information pulled into the AVS if appropriate.

## 2024-07-03 LAB — HBA1C MFR BLD: 6 % (ref 4.8–5.6)

## 2024-07-10 DIAGNOSIS — E78.2 MIXED HYPERLIPIDEMIA: Primary | ICD-10-CM

## 2024-07-11 ENCOUNTER — REFERRAL TRIAGE (OUTPATIENT)
Dept: CASE MANAGEMENT | Facility: OTHER | Age: 64
End: 2024-07-11
Payer: COMMERCIAL

## 2024-07-12 ENCOUNTER — PATIENT OUTREACH (OUTPATIENT)
Dept: CASE MANAGEMENT | Facility: OTHER | Age: 64
End: 2024-07-12
Payer: COMMERCIAL

## 2024-07-12 ENCOUNTER — TELEPHONE (OUTPATIENT)
Dept: CASE MANAGEMENT | Facility: OTHER | Age: 64
End: 2024-07-12
Payer: COMMERCIAL

## 2024-07-12 NOTE — OUTREACH NOTE
AMBULATORY CASE MANAGEMENT NOTE    Names and Relationships of Patient/Support Persons: Contact: Srinivasa Paz; Relationship: Self -     CCM Interim Update    Patient returned call. Introduced self, role and reason for the call. Assessed needs. Will enroll patient to [facility] to assist short term with medication assistance and resources. Discussed medication cost and possibly use Livalo Savings card. Also will send SHIP info. Patient requested information to be sent to his email. I will also send information via [employer]. Provided my direct contact info and encouraged patient to reach out for assistance.     Education Documentation  No documentation found.        Kassandra NOVOA  Ambulatory Case Management    7/12/2024, 10:43 EDT

## 2024-07-23 ENCOUNTER — PATIENT OUTREACH (OUTPATIENT)
Dept: CASE MANAGEMENT | Facility: OTHER | Age: 64
End: 2024-07-23
Payer: COMMERCIAL

## 2024-07-23 NOTE — OUTREACH NOTE
AMBULATORY CASE MANAGEMENT NOTE    Names and Relationships of Patient/Support Persons: Contact: Srinivasa Paz; Relationship: Self -     Patient Outreach    Called and spoke to patient to follow up. Patient confirmed receipt of the discount card sent to him to see if it'll help with the cost of Livalo although he has not been to his pharmacy to get it processed. He will plan on working on it this week. He is agreeable for a follow up call in 1-2 weeks.     Education Documentation  No documentation found.        Kassandra NOVOA  Ambulatory Case Management    7/23/2024, 09:53 EDT

## 2024-08-13 ENCOUNTER — TELEPHONE (OUTPATIENT)
Dept: CASE MANAGEMENT | Facility: OTHER | Age: 64
End: 2024-08-13
Payer: COMMERCIAL

## 2024-08-20 ENCOUNTER — TELEPHONE (OUTPATIENT)
Dept: CASE MANAGEMENT | Facility: OTHER | Age: 64
End: 2024-08-20
Payer: COMMERCIAL

## 2024-08-27 ENCOUNTER — PATIENT OUTREACH (OUTPATIENT)
Dept: CASE MANAGEMENT | Facility: OTHER | Age: 64
End: 2024-08-27
Payer: COMMERCIAL

## 2024-08-27 NOTE — OUTREACH NOTE
AMBULATORY CASE MANAGEMENT NOTE    Names and Relationships of Patient/Support Persons: Contact: Srinivasa Paz; Relationship: Self -     Patient Outreach    Sent message via [employer]. Closing HR UTR x3, lost contact with patient.     Education Documentation  No documentation found.        Kassandra NOVOA  Ambulatory Case Management    8/27/2024, 10:04 EDT

## 2024-10-09 ENCOUNTER — OFFICE VISIT (OUTPATIENT)
Dept: FAMILY MEDICINE CLINIC | Facility: CLINIC | Age: 64
End: 2024-10-09
Payer: MEDICARE

## 2024-10-09 VITALS
SYSTOLIC BLOOD PRESSURE: 110 MMHG | DIASTOLIC BLOOD PRESSURE: 72 MMHG | HEART RATE: 62 BPM | HEIGHT: 69 IN | WEIGHT: 188.2 LBS | OXYGEN SATURATION: 99 % | BODY MASS INDEX: 27.88 KG/M2 | TEMPERATURE: 98 F

## 2024-10-09 DIAGNOSIS — E78.5 HYPERLIPIDEMIA, UNSPECIFIED HYPERLIPIDEMIA TYPE: ICD-10-CM

## 2024-10-09 DIAGNOSIS — Z00.00 ENCOUNTER FOR SUBSEQUENT ANNUAL WELLNESS VISIT (AWV) IN MEDICARE PATIENT: Primary | ICD-10-CM

## 2024-10-09 DIAGNOSIS — F51.01 PRIMARY INSOMNIA: ICD-10-CM

## 2024-10-09 DIAGNOSIS — Z23 ENCOUNTER FOR IMMUNIZATION: ICD-10-CM

## 2024-10-09 PROBLEM — R91.8 ABNORMAL FINDINGS ON DIAGNOSTIC IMAGING OF LUNG: Status: RESOLVED | Noted: 2022-09-11 | Resolved: 2024-10-09

## 2024-10-09 PROBLEM — Z09 HOSPITAL DISCHARGE FOLLOW-UP: Status: RESOLVED | Noted: 2022-09-23 | Resolved: 2024-10-09

## 2024-10-09 PROCEDURE — 1170F FXNL STATUS ASSESSED: CPT | Performed by: STUDENT IN AN ORGANIZED HEALTH CARE EDUCATION/TRAINING PROGRAM

## 2024-10-09 PROCEDURE — G0439 PPPS, SUBSEQ VISIT: HCPCS | Performed by: STUDENT IN AN ORGANIZED HEALTH CARE EDUCATION/TRAINING PROGRAM

## 2024-10-09 PROCEDURE — G0008 ADMIN INFLUENZA VIRUS VAC: HCPCS | Performed by: STUDENT IN AN ORGANIZED HEALTH CARE EDUCATION/TRAINING PROGRAM

## 2024-10-09 PROCEDURE — 1126F AMNT PAIN NOTED NONE PRSNT: CPT | Performed by: STUDENT IN AN ORGANIZED HEALTH CARE EDUCATION/TRAINING PROGRAM

## 2024-10-09 PROCEDURE — 90662 IIV NO PRSV INCREASED AG IM: CPT | Performed by: STUDENT IN AN ORGANIZED HEALTH CARE EDUCATION/TRAINING PROGRAM

## 2024-10-09 RX ORDER — MIRTAZAPINE 7.5 MG/1
7.5 TABLET, FILM COATED ORAL NIGHTLY
Qty: 30 TABLET | Refills: 0 | Status: SHIPPED | OUTPATIENT
Start: 2024-10-09

## 2024-10-09 RX ORDER — TRAZODONE HYDROCHLORIDE 50 MG/1
100 TABLET, FILM COATED ORAL NIGHTLY
COMMUNITY
End: 2024-10-09

## 2024-10-09 RX ORDER — TRAZODONE HYDROCHLORIDE 100 MG/1
50 TABLET ORAL NIGHTLY
COMMUNITY
End: 2024-10-09

## 2024-10-09 RX ORDER — EZETIMIBE 10 MG/1
10 TABLET ORAL DAILY
Qty: 90 TABLET | Refills: 0 | Status: SHIPPED | OUTPATIENT
Start: 2024-10-09

## 2024-10-09 NOTE — PROGRESS NOTES
Subjective   The ABCs of the Annual Wellness Visit  Medicare Wellness Visit      Srinivasa Paz is a 64 y.o. patient who presents for a Medicare Wellness Visit.    The following portions of the patient's history were reviewed and   updated as appropriate: allergies, current medications, past family history, past medical history, past social history, past surgical history, and problem list.    Compared to one year ago, the patient's physical   health is the same. Does not walk as much as he use to.   Compared to one year ago, the patient's mental   health is the same. [facility]     Recent Hospitalizations:  He was not admitted to the hospital during the last year.     Current Medical Providers:  Patient Care Team:  Kristie Miranda DO as PCP - General (Family Medicine)    Outpatient Medications Prior to Visit   Medication Sig Dispense Refill    Magnesium 250 MG tablet Take  by mouth Every Night.      Pitavastatin Magnesium 4 MG tablet Take 4 mg by mouth Daily. 90 tablet 3    mirtazapine (REMERON) 7.5 MG tablet Take 1 tablet by mouth Every Night. 30 tablet 0    traZODone (DESYREL) 100 MG tablet Take 0.5 tablets by mouth Every Night.      traZODone (DESYREL) 50 MG tablet Take 2 tablets by mouth Every Night.       No facility-administered medications prior to visit.     No opioid medication identified on active medication list. I have reviewed chart for other potential  high risk medication/s and harmful drug interactions in the elderly.      Aspirin is not on active medication list.  Aspirin use is not indicated based on review of current medical condition/s. Risk of harm outweighs potential benefits.  .    Patient Active Problem List   Diagnosis    Medicare annual wellness visit, subsequent    Hyperlipidemia    Depressive disorder    Anxiety    Encounter for monitoring cardiotoxic drug therapy    Follicular lymphoma    History of non-Hodgkin's lymphoma    History of antineoplastic chemotherapy  "   Personal history of radiation exposure    Former smoker    Hyperglycemia    Folic acid deficiency    Acute pain of right shoulder    Dental caries    Hematuria    Hyperkalemia    Left lower quadrant pain    Malignant lymphoma    DLBCL (diffuse large B cell lymphoma)     Advance Care Planning Advance Directive is not on file.  ACP discussion was held with the patient during this visit. Patient does not have an advance directive, information provided.            Objective   Vitals:    10/09/24 0902   BP: 110/72   Pulse: 62   Temp: 98 °F (36.7 °C)   SpO2: 99%   Weight: 85.4 kg (188 lb 3.2 oz)   Height: 175.3 cm (69\")       Estimated body mass index is 27.79 kg/m² as calculated from the following:    Height as of this encounter: 175.3 cm (69\").    Weight as of this encounter: 85.4 kg (188 lb 3.2 oz).            Does the patient have evidence of cognitive impairment? No                                                                                               Health  Risk Assessment    Smoking Status:  Social History     Tobacco Use   Smoking Status Former    Current packs/day: 0.00    Types: Cigarettes    Quit date: 9/15/2020    Years since quittin.0    Passive exposure: Past   Smokeless Tobacco Never     Alcohol Consumption:  Social History     Substance and Sexual Activity   Alcohol Use Not Currently    Comment: sober for 20 yrs       Fall Risk Screen  STEADI Fall Risk Assessment was completed, and patient is at LOW risk for falls.Assessment completed on:10/9/2024    Depression Screening:      10/9/2024     8:59 AM   PHQ-2/PHQ-9 Depression Screening   Little Interest or Pleasure in Doing Things 0-->not at all   Feeling Down, Depressed or Hopeless 0-->not at all   PHQ-9: Brief Depression Severity Measure Score 0     Health Habits and Functional and Cognitive Screening:      10/9/2024     9:00 AM   Functional & Cognitive Status   Do you have difficulty preparing food and eating? No   Do you have difficulty " bathing yourself, getting dressed or grooming yourself? No   Do you have difficulty using the toilet? No   Do you have difficulty moving around from place to place? No   Do you have trouble with steps or getting out of a bed or a chair? No   Current Diet Unhealthy Diet   Dental Exam Up to date   Eye Exam Not up to date   Exercise (times per week) 3 times per week   Current Exercises Include Walking;Home Fitness Gym   Do you need help using the phone?  No   Are you deaf or do you have serious difficulty hearing?  No   Do you need help to go to places out of walking distance? No   Do you need help shopping? No   Do you need help preparing meals?  No   Do you need help with housework?  No   Do you need help with laundry? No   Do you need help taking your medications? No   Do you need help managing money? No   Do you ever drive or ride in a car without wearing a seat belt? No   Have you felt unusual stress, anger or loneliness in the last month? No   Who do you live with? Spouse   If you need help, do you have trouble finding someone available to you? No   Do you have difficulty concentrating, remembering or making decisions? No           Age-appropriate Screening Schedule:  Refer to the list below for future screening recommendations based on patient's age, sex and/or medical conditions. Orders for these recommended tests are listed in the plan section. The patient has been provided with a written plan.    Health Maintenance List  Health Maintenance   Topic Date Due    ZOSTER VACCINE (2 of 2) 11/28/2019    COVID-19 Vaccine (6 - 2023-24 season) 10/11/2024 (Originally 9/1/2024)    LIPID PANEL  01/03/2025    ANNUAL WELLNESS VISIT  10/09/2025    BMI FOLLOWUP  10/09/2025    COLORECTAL CANCER SCREENING  12/08/2027    TDAP/TD VACCINES (2 - Td or Tdap) 05/21/2029    INFLUENZA VACCINE  Completed    Pneumococcal Vaccine 65+  Completed    AAA SCREEN (ONE-TIME)  Completed    HEPATITIS C SCREENING  Discontinued                                                                                                                                                 CMS Preventative Services Quick Reference  Risk Factors Identified During Encounter  None Identified    The above risks/problems have been discussed with the patient.  Pertinent information has been shared with the patient in the After Visit Summary.  An After Visit Summary and PPPS were made available to the patient.    Follow Up:   Next Medicare Wellness visit to be scheduled in 1 year.     Assessment & Plan  Primary insomnia    Hyperlipidemia, unspecified hyperlipidemia type     Encounter for immunization    Encounter for subsequent annual wellness visit (AWV) in Medicare patient  Information for advanced directive added to AVS  Preventative health info added to AVS as well       Orders Placed This Encounter   Procedures    Fluzone High-Dose 65+yrs ([id number])     New Medications Ordered This Visit   Medications    mirtazapine (REMERON) 7.5 MG tablet     Sig: Take 1 tablet by mouth Every Night.     Dispense:  30 tablet     Refill:  0    ezetimibe (Zetia) 10 MG tablet     Sig: Take 1 tablet by mouth Daily.     Dispense:  90 tablet     Refill:  0          Follow Up:   Return in about 6 months (around 4/9/2025) for Chronic care.

## 2024-10-09 NOTE — TELEPHONE ENCOUNTER
LOV                   10/9/2024  NOV                   4/9/2025  Last refill             unknown  Protocol              not met    Please advise

## 2024-10-10 RX ORDER — TRAZODONE HYDROCHLORIDE 100 MG/1
100 TABLET ORAL NIGHTLY
Qty: 90 TABLET | Refills: 0 | Status: SHIPPED | OUTPATIENT
Start: 2024-10-10

## 2025-01-17 DIAGNOSIS — F51.01 PRIMARY INSOMNIA: ICD-10-CM

## 2025-01-17 DIAGNOSIS — E78.5 HYPERLIPIDEMIA, UNSPECIFIED HYPERLIPIDEMIA TYPE: ICD-10-CM

## 2025-01-20 RX ORDER — EZETIMIBE 10 MG/1
10 TABLET ORAL DAILY
Qty: 90 TABLET | Refills: 0 | Status: SHIPPED | OUTPATIENT
Start: 2025-01-20

## 2025-01-20 RX ORDER — TRAZODONE HYDROCHLORIDE 100 MG/1
100 TABLET ORAL NIGHTLY
Qty: 90 TABLET | Refills: 0 | Status: SHIPPED | OUTPATIENT
Start: 2025-01-20

## 2025-04-10 ENCOUNTER — OFFICE VISIT (OUTPATIENT)
Dept: FAMILY MEDICINE CLINIC | Facility: CLINIC | Age: 65
End: 2025-04-10
Payer: MEDICARE

## 2025-04-10 VITALS
SYSTOLIC BLOOD PRESSURE: 116 MMHG | OXYGEN SATURATION: 98 % | HEIGHT: 69 IN | WEIGHT: 189 LBS | BODY MASS INDEX: 27.99 KG/M2 | DIASTOLIC BLOOD PRESSURE: 70 MMHG | TEMPERATURE: 98.2 F | HEART RATE: 62 BPM

## 2025-04-10 DIAGNOSIS — F32.A DEPRESSIVE DISORDER: ICD-10-CM

## 2025-04-10 DIAGNOSIS — R73.03 PREDIABETES: ICD-10-CM

## 2025-04-10 DIAGNOSIS — C82.90 FOLLICULAR LYMPHOMA, UNSPECIFIED FOLLICULAR LYMPHOMA TYPE, UNSPECIFIED BODY REGION: ICD-10-CM

## 2025-04-10 DIAGNOSIS — L98.9 SKIN LESION OF CHEST WALL: ICD-10-CM

## 2025-04-10 DIAGNOSIS — F51.01 PRIMARY INSOMNIA: ICD-10-CM

## 2025-04-10 DIAGNOSIS — E78.2 MIXED HYPERLIPIDEMIA: ICD-10-CM

## 2025-04-10 DIAGNOSIS — G63 NEUROPATHY ASSOCIATED WITH LYMPHOMA: Primary | ICD-10-CM

## 2025-04-10 DIAGNOSIS — T45.1X5A CHEMOTHERAPY-INDUCED NEUROPATHY: ICD-10-CM

## 2025-04-10 DIAGNOSIS — F41.9 ANXIETY: ICD-10-CM

## 2025-04-10 DIAGNOSIS — G62.0 CHEMOTHERAPY-INDUCED NEUROPATHY: ICD-10-CM

## 2025-04-10 DIAGNOSIS — C85.90 NEUROPATHY ASSOCIATED WITH LYMPHOMA: Primary | ICD-10-CM

## 2025-04-10 RX ORDER — TRAZODONE HYDROCHLORIDE 50 MG/1
50 TABLET ORAL NIGHTLY
Qty: 90 TABLET | Refills: 3 | Status: SHIPPED | OUTPATIENT
Start: 2025-04-10

## 2025-04-10 NOTE — PROGRESS NOTES
Chief Complaint  Hyperlipidemia and Sore (On chest that burns that has been there for over 6 months)    Subjective        Srinivasa Paz presents to [facility]    History of Present Illness  The patient presents for evaluation of right foot pain, nonhealing skin lesion, depression and anxiety.    He has been experiencing intermittent burning and pain in his right foot, which he attributes to a previous diagnosis of prediabetes. He reports no current hematuria. He has been utilizing insoles prescribed by a podiatrist and has been informed that his cancer medication may induce neuropathy. He has been taking a daily multivitamin but ran out of it yesterday. He finds relief from the foot pain through massage, which he performs for approximately 15 minutes. He also takes a sleep aid, which induces sleep within 10 minutes, providing temporary respite from the neuropathy. The frequency of his foot massages is not daily, but he has observed an increase in the need for them since ceasing work. He speculates that prolonged standing at work may have exacerbated the condition.    He has a persistent sore on his right foot, present for over a year, which causes discomfort upon touch. Despite treatment with Neosporin, alcohol, and peroxide, the sore remains unhealed. He has an upcoming appointment with his oncologist next week.    His wife suspects him of being depressed, and he acknowledges high levels of anxiety. He is currently taking trazodone but has discontinued mirtazapine due to concerns about side effects. He continues to take melatonin 10 mg but reports difficulty maintaining sleep. He is considering resuming marijuana use, which he found beneficial for sleep and anxiety in the past, but is hesitant due to potential paranoia. He has tried CBD gummies for sleep but did not find them helpful. He has a history of alcoholism and is struggling with his son's alcoholism. He is a recovered alcoholic  "and finds it challenging to have alcohol in his home. He is concerned about his son and his history. His son told him the other day that he wants to die. His son would not do therapy or AA. He knows that his son is drinking his feelings away. They have done everything they can do for him, and he will not put him out of the house. He does not get physical anymore because he did not feel good after he broke his back. He is thinking about all the suffering they have done to make sure he had a better life than they had and then to keep it up on alcohol. He argues with his wife about the alcohol. He needs to find somebody that he can talk about this.    SOCIAL HISTORY  The patient is a recovered alcoholic and finds it challenging to have alcohol in his home. He is considering resuming marijuana use, which he found beneficial for sleep and anxiety in the past, but is hesitant due to potential paranoia.    FAMILY HISTORY  The patient comes from an alcoholic family.    MEDICATIONS  Current: Neosporin, multivitamin, trazodone, melatonin  Past: Wellbutrin, Celexa, Pristiq, triazolam    IMMUNIZATIONS  The patient received a COVID-19 vaccine in November 2023.       Objective   Vital Signs:  /70 (BP Location: Left arm, Patient Position: Sitting, Cuff Size: Adult)   Pulse 62   Temp 98.2 °F (36.8 °C)   Ht 175.3 cm (69\")   Wt 85.7 kg (189 lb)   SpO2 98%   BMI 27.91 kg/m²   Estimated body mass index is 27.91 kg/m² as calculated from the following:    Height as of this encounter: 175.3 cm (69\").    Weight as of this encounter: 85.7 kg (189 lb).            Physical Exam  Vitals and nursing note reviewed.   Constitutional:       General: He is not in acute distress.     Appearance: Normal appearance. He is not ill-appearing.   HENT:      Head: Normocephalic and atraumatic.      Nose: Nose normal.      Mouth/Throat:      Mouth: Mucous membranes are moist.   Eyes:      Extraocular Movements: Extraocular movements intact.      " Conjunctiva/sclera: Conjunctivae normal.   Cardiovascular:      Rate and Rhythm: Normal rate and regular rhythm.      Heart sounds: Normal heart sounds. No murmur heard.     No gallop.   Pulmonary:      Effort: Pulmonary effort is normal. No respiratory distress.      Breath sounds: Normal breath sounds. No stridor. No wheezing, rhonchi or rales.   Chest:      Chest wall: No tenderness.   Skin:     General: Skin is warm and dry.             Comments: Small, raised, nonhealing skin lesion    Neurological:      General: No focal deficit present.      Mental Status: He is alert and oriented to person, place, and time. Mental status is at baseline.   Psychiatric:         Mood and Affect: Mood normal.         Behavior: Behavior normal.          Physical Exam  Lungs were auscultated.       Result Review :               Results            Assessment and Plan   Diagnoses and all orders for this visit:    1. Neuropathy associated with lymphoma (Primary)    2. Chemotherapy-induced neuropathy  -     Ibuprofen 3 %, Gabapentin 10 %, Baclofen 2 %, lidocaine 4 %; Apply 1-2 g topically to the appropriate area as directed 3 (Three) to 4 (Four) times daily.  Dispense: 90 g; Refill: 3    3. Mixed hyperlipidemia  -     Lipid panel    4. Prediabetes  -     Comprehensive metabolic panel  -     Hemoglobin A1c    5. Follicular lymphoma, unspecified follicular lymphoma type, unspecified body region  -     Comprehensive metabolic panel  -     CBC w AUTO Differential  -     Lactate Dehydrogenase    6. Depressive disorder    7. Anxiety    8. Primary insomnia  -     traZODone (DESYREL) 50 MG tablet; Take 1 tablet by mouth Every Night.  Dispense: 90 tablet; Refill: 3    9. Skin lesion of chest wall        Assessment & Plan  1. Neuropathy.  The etiology of the neuropathy could be multifactorial, potentially linked to diabetes, chemotherapy, or spinal issues. A compound cream will be prescribed to alleviate the neuropathic symptoms. The cream will  be delivered to his house or can be picked up from [facility].    2. Nonhealing skin lesion.  A biopsy of the nonhealing skin lesion will be conducted to rule out malignancy. This will be coordinated with his oncologist during his upcoming visit next week.    3. Depression and anxiety.  He is advised to consider seeking therapy or attending an Al-Anon class to address his mental health concerns. He will review the side effects of mirtazapine and communicate any concerns.    4. Health Maintenance.  He is advised to receive the second dose of the shingles vaccine at his pharmacy if he has not already done so. A CBC, CMP, and LDH will be ordered today, and the results will be forwarded to his oncologist.            Follow Up   Return in about 6 months (around 10/10/2025) for Chronic care.  Patient was given instructions and counseling regarding his condition or for health maintenance advice. Please see specific information pulled into the AVS if appropriate.           Patient or patient representative verbalized consent for the use of Ambient Listening during the visit with  Kristie Miranda DO for chart documentation. 4/10/2025  12:33 EDT

## 2025-04-11 LAB
ALBUMIN SERPL-MCNC: 4.4 G/DL (ref 3.5–5.2)
ALBUMIN/GLOB SERPL: 1.9 G/DL
ALP SERPL-CCNC: 111 U/L (ref 39–117)
ALT SERPL-CCNC: 39 U/L (ref 1–41)
AST SERPL-CCNC: 29 U/L (ref 1–40)
BASOPHILS # BLD AUTO: 0.07 10*3/MM3 (ref 0–0.2)
BASOPHILS NFR BLD AUTO: 1.5 % (ref 0–1.5)
BILIRUB SERPL-MCNC: 0.6 MG/DL (ref 0–1.2)
BUN SERPL-MCNC: 12 MG/DL (ref 8–23)
BUN/CREAT SERPL: 11.4 (ref 7–25)
CALCIUM SERPL-MCNC: 9.4 MG/DL (ref 8.6–10.5)
CHLORIDE SERPL-SCNC: 105 MMOL/L (ref 98–107)
CHOLEST SERPL-MCNC: 189 MG/DL (ref 0–200)
CO2 SERPL-SCNC: 26.4 MMOL/L (ref 22–29)
CREAT SERPL-MCNC: 1.05 MG/DL (ref 0.76–1.27)
EGFRCR SERPLBLD CKD-EPI 2021: 78.8 ML/MIN/1.73
EOSINOPHIL # BLD AUTO: 0.13 10*3/MM3 (ref 0–0.4)
EOSINOPHIL NFR BLD AUTO: 2.8 % (ref 0.3–6.2)
ERYTHROCYTE [DISTWIDTH] IN BLOOD BY AUTOMATED COUNT: 13.5 % (ref 12.3–15.4)
GLOBULIN SER CALC-MCNC: 2.3 GM/DL
GLUCOSE SERPL-MCNC: 109 MG/DL (ref 65–99)
HBA1C MFR BLD: 5.8 % (ref 4.8–5.6)
HCT VFR BLD AUTO: 46.9 % (ref 37.5–51)
HDLC SERPL-MCNC: 47 MG/DL (ref 40–60)
HGB BLD-MCNC: 15.1 G/DL (ref 13–17.7)
IMM GRANULOCYTES # BLD AUTO: 0.01 10*3/MM3 (ref 0–0.05)
IMM GRANULOCYTES NFR BLD AUTO: 0.2 % (ref 0–0.5)
LDH SERPL L TO P-CCNC: 201 U/L (ref 135–225)
LDLC SERPL CALC-MCNC: 126 MG/DL (ref 0–100)
LYMPHOCYTES # BLD AUTO: 1.2 10*3/MM3 (ref 0.7–3.1)
LYMPHOCYTES NFR BLD AUTO: 26.2 % (ref 19.6–45.3)
MCH RBC QN AUTO: 29.8 PG (ref 26.6–33)
MCHC RBC AUTO-ENTMCNC: 32.2 G/DL (ref 31.5–35.7)
MCV RBC AUTO: 92.7 FL (ref 79–97)
MONOCYTES # BLD AUTO: 0.43 10*3/MM3 (ref 0.1–0.9)
MONOCYTES NFR BLD AUTO: 9.4 % (ref 5–12)
NEUTROPHILS # BLD AUTO: 2.74 10*3/MM3 (ref 1.7–7)
NEUTROPHILS NFR BLD AUTO: 59.9 % (ref 42.7–76)
NRBC BLD AUTO-RTO: 0 /100 WBC (ref 0–0.2)
PLATELET # BLD AUTO: 147 10*3/MM3 (ref 140–450)
POTASSIUM SERPL-SCNC: 4.8 MMOL/L (ref 3.5–5.2)
PROT SERPL-MCNC: 6.7 G/DL (ref 6–8.5)
RBC # BLD AUTO: 5.06 10*6/MM3 (ref 4.14–5.8)
SODIUM SERPL-SCNC: 139 MMOL/L (ref 136–145)
TRIGL SERPL-MCNC: 88 MG/DL (ref 0–150)
VLDLC SERPL CALC-MCNC: 16 MG/DL (ref 5–40)
WBC # BLD AUTO: 4.58 10*3/MM3 (ref 3.4–10.8)

## 2025-04-15 ENCOUNTER — RESULTS FOLLOW-UP (OUTPATIENT)
Dept: FAMILY MEDICINE CLINIC | Facility: CLINIC | Age: 65
End: 2025-04-15

## 2025-05-27 DIAGNOSIS — E78.5 HYPERLIPIDEMIA, UNSPECIFIED HYPERLIPIDEMIA TYPE: ICD-10-CM

## 2025-05-27 NOTE — TELEPHONE ENCOUNTER
Caller: Srinivasa Paz    Relationship to Patient: Self    Phone Number:[phone]     Reason for Call: PATIENT CALLING WANTING TO GET HIS TEST RESULTS, HE WOULD ALSO LIKE TO KNOW IF HE NEEDS TO CONTINUE TAKING HIS CHOLESTEROL MEDICATION. PATIENT STATES THAT IF HE NEEDS TO TAKE THE MEDICATION HE NEEDS A REFILL.

## 2025-05-28 RX ORDER — EZETIMIBE 10 MG/1
10 TABLET ORAL DAILY
Qty: 90 TABLET | Refills: 0 | Status: SHIPPED | OUTPATIENT
Start: 2025-05-28

## 2025-08-28 DIAGNOSIS — E78.5 HYPERLIPIDEMIA, UNSPECIFIED HYPERLIPIDEMIA TYPE: ICD-10-CM

## 2025-08-28 RX ORDER — EZETIMIBE 10 MG/1
10 TABLET ORAL DAILY
Qty: 90 TABLET | Refills: 0 | Status: SHIPPED | OUTPATIENT
Start: 2025-08-28